# Patient Record
Sex: FEMALE | Race: BLACK OR AFRICAN AMERICAN | Employment: STUDENT | URBAN - METROPOLITAN AREA
[De-identification: names, ages, dates, MRNs, and addresses within clinical notes are randomized per-mention and may not be internally consistent; named-entity substitution may affect disease eponyms.]

---
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
## 2018-01-10 ENCOUNTER — OFFICE VISIT (OUTPATIENT)
Dept: INTERNAL MEDICINE CLINIC | Facility: CLINIC | Age: 18
End: 2018-01-10

## 2018-01-10 VITALS
WEIGHT: 211 LBS | OXYGEN SATURATION: 100 % | TEMPERATURE: 98 F | RESPIRATION RATE: 17 BRPM | BODY MASS INDEX: 35.59 KG/M2 | DIASTOLIC BLOOD PRESSURE: 66 MMHG | HEIGHT: 64.5 IN | HEART RATE: 92 BPM | SYSTOLIC BLOOD PRESSURE: 100 MMHG

## 2018-01-10 DIAGNOSIS — Z11.3 SCREEN FOR STD (SEXUALLY TRANSMITTED DISEASE): Primary | ICD-10-CM

## 2018-01-10 PROCEDURE — 87340 HEPATITIS B SURFACE AG IA: CPT | Performed by: INTERNAL MEDICINE

## 2018-01-10 PROCEDURE — 36415 COLL VENOUS BLD VENIPUNCTURE: CPT | Performed by: INTERNAL MEDICINE

## 2018-01-10 PROCEDURE — 86694 HERPES SIMPLEX NES ANTBDY: CPT | Performed by: INTERNAL MEDICINE

## 2018-01-10 PROCEDURE — 86780 TREPONEMA PALLIDUM: CPT | Performed by: INTERNAL MEDICINE

## 2018-01-10 PROCEDURE — 99213 OFFICE O/P EST LOW 20 MIN: CPT | Performed by: INTERNAL MEDICINE

## 2018-01-10 PROCEDURE — 87491 CHLMYD TRACH DNA AMP PROBE: CPT | Performed by: INTERNAL MEDICINE

## 2018-01-10 PROCEDURE — 87389 HIV-1 AG W/HIV-1&-2 AB AG IA: CPT | Performed by: INTERNAL MEDICINE

## 2018-01-10 PROCEDURE — 87591 N.GONORRHOEAE DNA AMP PROB: CPT | Performed by: INTERNAL MEDICINE

## 2018-01-10 NOTE — PROGRESS NOTES
HPI:    Patient ID: Meliza Nowak is a 21year old female. HPI    Patient wants to have std screening currently not sexually active. LMp 3/2017. She had implantable Nexplanon for contracepcion.      Singe partner in the past. Had completed immuniza ASSESSMENT/PLAN:   Screen for std (sexually transmitted disease)  (primary encounter diagnosis)      Discussed safe sex. . She stated not currently sexual active.      Orders Placed This Encounter      HIV AG AB Combo [E]      Hepatitis B Surface Antigen [

## 2018-01-10 NOTE — PROGRESS NOTES
Pt presented to clinic today for blood draw. Per physician able to draw orders. Orders  documented within chart. Pt tolerated lab draw well.  verified.   Orders drawn include: herpes, t pallidum, hepatitisb, HIV,   Site of draw:rt arm   Sp Courts,

## 2018-01-11 LAB
C TRACH DNA SPEC QL NAA+PROBE: NEGATIVE
HBV SURFACE AG SERPL QL IA: NONREACTIVE
HIV1+2 AB SERPL QL IA: NONREACTIVE
N GONORRHOEA DNA SPEC QL NAA+PROBE: NEGATIVE

## 2018-01-12 LAB — T PALLIDUM AB SER QL: NEGATIVE

## 2018-01-13 LAB
HSV TYPE 1/2 COMBINED AB, IGG: 0.5 IV
HSV TYPE 1/2 COMBINED ABS, IGM: 0.13 IV

## 2018-01-15 ENCOUNTER — TELEPHONE (OUTPATIENT)
Dept: OBGYN CLINIC | Facility: CLINIC | Age: 18
End: 2018-01-15

## 2018-01-15 RX ORDER — METRONIDAZOLE 500 MG/1
500 TABLET ORAL 2 TIMES DAILY
Qty: 14 TABLET | Refills: 0 | Status: SHIPPED | OUTPATIENT
Start: 2018-01-15 | End: 2018-01-22

## 2018-01-15 NOTE — TELEPHONE ENCOUNTER
Patient requesting RX for bacterial vaginosis. Treated in the past for it. Unable to come in prior to return to school. Agrees to see On license of [facility] for follow-up. Rx sent to pharmacy provided.

## 2018-05-30 ENCOUNTER — OFFICE VISIT (OUTPATIENT)
Dept: INTERNAL MEDICINE CLINIC | Facility: CLINIC | Age: 18
End: 2018-05-30

## 2018-05-30 VITALS
RESPIRATION RATE: 18 BRPM | OXYGEN SATURATION: 98 % | SYSTOLIC BLOOD PRESSURE: 128 MMHG | HEART RATE: 88 BPM | BODY MASS INDEX: 34.07 KG/M2 | TEMPERATURE: 98 F | DIASTOLIC BLOOD PRESSURE: 80 MMHG | HEIGHT: 64.5 IN | WEIGHT: 202 LBS

## 2018-05-30 DIAGNOSIS — D64.9 ANEMIA, UNSPECIFIED TYPE: ICD-10-CM

## 2018-05-30 DIAGNOSIS — Z51.81 THERAPEUTIC DRUG MONITORING: Primary | ICD-10-CM

## 2018-05-30 DIAGNOSIS — E66.9 OBESITY (BMI 30-39.9): ICD-10-CM

## 2018-05-30 PROCEDURE — 99215 OFFICE O/P EST HI 40 MIN: CPT | Performed by: INTERNAL MEDICINE

## 2018-05-30 PROCEDURE — 93005 ELECTROCARDIOGRAM TRACING: CPT | Performed by: INTERNAL MEDICINE

## 2018-05-30 RX ORDER — IBUPROFEN 800 MG/1
TABLET ORAL
Refills: 0 | COMMUNITY
Start: 2018-03-23 | End: 2018-05-30 | Stop reason: ALTCHOICE

## 2018-05-30 NOTE — PROGRESS NOTES
Cosme Aldrich is a 21year old female. Chief complaint:  weight loss management and obesity related comorbidities    HPI:     Cosme Aldrich is a 21year old female new to our office today.    with PMH as listed below here for weight management   Denis Hendrickson Smokeless tobacco: Never Used                      Alcohol use:  No              Family History   Problem Relation Age of Onset   • Cancer Father    • Cancer Mother    • Hypertension Maternal Sade Montano Medication: 10/10, Surgery interest: 0/10. Counseled on comprehensive weight loss plan including attention to nutrition, exercise and behavior/stress management for success.      · Advised the patient to do blood work as ordered and based on blood work w

## 2018-05-30 NOTE — PATIENT INSTRUCTIONS
Svetlana Left    Welcome to [facility]. We are excited that you are committed to improving your health and have invited our practice to be part of your journey.  Our approach to the medical management of weight loss is similar to that of other of water per day, add fiber ( benefiber) to the water to increase fullness, overcome constipation    · Eat slowly    · Do not drink your calories ( no regular pop, juice, high calorie coffee drinks, limit alcohol) Also stay away from artificially sweetened

## 2018-05-31 ENCOUNTER — LAB ENCOUNTER (OUTPATIENT)
Dept: LAB | Age: 18
End: 2018-05-31
Attending: INTERNAL MEDICINE
Payer: MEDICAID

## 2018-05-31 DIAGNOSIS — Z51.81 THERAPEUTIC DRUG MONITORING: ICD-10-CM

## 2018-05-31 DIAGNOSIS — D64.9 ANEMIA, UNSPECIFIED TYPE: ICD-10-CM

## 2018-05-31 DIAGNOSIS — E66.9 OBESITY (BMI 30-39.9): ICD-10-CM

## 2018-05-31 PROCEDURE — 82306 VITAMIN D 25 HYDROXY: CPT

## 2018-05-31 PROCEDURE — 80053 COMPREHEN METABOLIC PANEL: CPT

## 2018-05-31 PROCEDURE — 83540 ASSAY OF IRON: CPT

## 2018-05-31 PROCEDURE — 36415 COLL VENOUS BLD VENIPUNCTURE: CPT

## 2018-05-31 PROCEDURE — 82746 ASSAY OF FOLIC ACID SERUM: CPT

## 2018-05-31 PROCEDURE — 84466 ASSAY OF TRANSFERRIN: CPT

## 2018-05-31 PROCEDURE — 82607 VITAMIN B-12: CPT

## 2018-05-31 PROCEDURE — 84443 ASSAY THYROID STIM HORMONE: CPT

## 2018-05-31 PROCEDURE — 85025 COMPLETE CBC W/AUTO DIFF WBC: CPT

## 2018-05-31 PROCEDURE — 80061 LIPID PANEL: CPT

## 2018-05-31 PROCEDURE — 82728 ASSAY OF FERRITIN: CPT

## 2018-06-04 ENCOUNTER — TELEPHONE (OUTPATIENT)
Dept: INTERNAL MEDICINE CLINIC | Facility: CLINIC | Age: 18
End: 2018-06-04

## 2018-06-04 RX ORDER — PHENTERMINE HYDROCHLORIDE 15 MG/1
15 CAPSULE ORAL EVERY MORNING
Qty: 30 CAPSULE | Refills: 0 | Status: SHIPPED | OUTPATIENT
Start: 2018-06-04 | End: 2018-11-12

## 2018-06-04 RX ORDER — ERGOCALCIFEROL (VITAMIN D2) 1250 MCG
50000 CAPSULE ORAL WEEKLY
Qty: 12 CAPSULE | Refills: 1 | Status: SHIPPED | OUTPATIENT
Start: 2018-06-04 | End: 2020-06-24

## 2018-06-13 ENCOUNTER — OFFICE VISIT (OUTPATIENT)
Dept: INTERNAL MEDICINE CLINIC | Facility: CLINIC | Age: 18
End: 2018-06-13

## 2018-06-13 VITALS
BODY MASS INDEX: 34.07 KG/M2 | TEMPERATURE: 98 F | SYSTOLIC BLOOD PRESSURE: 110 MMHG | DIASTOLIC BLOOD PRESSURE: 64 MMHG | WEIGHT: 202 LBS | HEIGHT: 64.5 IN | OXYGEN SATURATION: 100 % | HEART RATE: 93 BPM

## 2018-06-13 DIAGNOSIS — D64.9 ANEMIA, UNSPECIFIED TYPE: ICD-10-CM

## 2018-06-13 DIAGNOSIS — Z00.00 PHYSICAL EXAM: Primary | ICD-10-CM

## 2018-06-13 DIAGNOSIS — J03.90 TONSILLITIS: ICD-10-CM

## 2018-06-13 PROCEDURE — 36415 COLL VENOUS BLD VENIPUNCTURE: CPT | Performed by: FAMILY MEDICINE

## 2018-06-13 PROCEDURE — 99395 PREV VISIT EST AGE 18-39: CPT | Performed by: FAMILY MEDICINE

## 2018-06-13 PROCEDURE — 86308 HETEROPHILE ANTIBODY SCREEN: CPT | Performed by: FAMILY MEDICINE

## 2018-06-13 RX ORDER — AMOXICILLIN AND CLAVULANATE POTASSIUM 875; 125 MG/1; MG/1
1 TABLET, FILM COATED ORAL 2 TIMES DAILY
Qty: 20 TABLET | Refills: 0 | Status: SHIPPED | OUTPATIENT
Start: 2018-06-13 | End: 2018-06-23

## 2018-06-13 RX ORDER — IBUPROFEN 600 MG/1
600 TABLET ORAL EVERY 6 HOURS PRN
Qty: 30 TABLET | Refills: 1 | Status: SHIPPED | OUTPATIENT
Start: 2018-06-13 | End: 2020-06-24

## 2018-06-13 NOTE — PROGRESS NOTES
Mikey Peguero is a 21year old female who presents for physical      Future Plans:[employer] [facility] [employer]   Diet and exercise: healthy  Immunizations: UTD  Sexually active: yes  Smoker: no   EtoH: no  Birth control: condoms, nexplanon yes  Former pelvic exam no  Year:  NEURO: denies headaches    EXAM:  /64   Pulse 93   Temp 98.4 °F (36.9 °C) (Oral)   Ht 64.5\"   Wt 202 lb   SpO2 100%   BMI 34.14 kg/m²   GENERAL: well developed, well nourished and in no apparent distress  SKIN: no

## 2018-06-13 NOTE — PATIENT INSTRUCTIONS
Gargle with warm salt water    Ibuprofen     Push fluids    Start iron twice daily with food once feeling better

## 2018-07-13 ENCOUNTER — TELEPHONE (OUTPATIENT)
Dept: OBGYN CLINIC | Facility: CLINIC | Age: 18
End: 2018-07-13

## 2019-01-04 ENCOUNTER — OFFICE VISIT (OUTPATIENT)
Dept: INTERNAL MEDICINE CLINIC | Facility: CLINIC | Age: 19
End: 2019-01-04
Payer: COMMERCIAL

## 2019-01-04 VITALS
DIASTOLIC BLOOD PRESSURE: 68 MMHG | SYSTOLIC BLOOD PRESSURE: 118 MMHG | WEIGHT: 214 LBS | OXYGEN SATURATION: 98 % | HEART RATE: 78 BPM | BODY MASS INDEX: 36.09 KG/M2 | TEMPERATURE: 99 F | HEIGHT: 64.5 IN

## 2019-01-04 DIAGNOSIS — B37.3 YEAST INFECTION OF THE VAGINA: Primary | ICD-10-CM

## 2019-01-04 DIAGNOSIS — R63.4 WEIGHT LOSS: ICD-10-CM

## 2019-01-04 DIAGNOSIS — L70.0 ACNE VULGARIS: ICD-10-CM

## 2019-01-04 DIAGNOSIS — B35.1 ONYCHOMYCOSIS: ICD-10-CM

## 2019-01-04 PROCEDURE — 88175 CYTOPATH C/V AUTO FLUID REDO: CPT | Performed by: INTERNAL MEDICINE

## 2019-01-04 PROCEDURE — 99214 OFFICE O/P EST MOD 30 MIN: CPT | Performed by: INTERNAL MEDICINE

## 2019-01-04 PROCEDURE — 87808 TRICHOMONAS ASSAY W/OPTIC: CPT | Performed by: INTERNAL MEDICINE

## 2019-01-04 PROCEDURE — 87106 FUNGI IDENTIFICATION YEAST: CPT | Performed by: INTERNAL MEDICINE

## 2019-01-04 PROCEDURE — 87205 SMEAR GRAM STAIN: CPT | Performed by: INTERNAL MEDICINE

## 2019-01-04 RX ORDER — PHENTERMINE HYDROCHLORIDE 15 MG/1
15 CAPSULE ORAL EVERY MORNING
Qty: 90 CAPSULE | Refills: 0 | Status: SHIPPED | OUTPATIENT
Start: 2019-01-04 | End: 2019-06-05

## 2019-01-04 RX ORDER — FLUCONAZOLE 150 MG/1
150 TABLET ORAL DAILY
Qty: 3 TABLET | Refills: 3 | Status: SHIPPED | OUTPATIENT
Start: 2019-01-04 | End: 2019-06-05 | Stop reason: ALTCHOICE

## 2019-01-04 RX ORDER — CLINDAMYCIN PHOSPHATE 10 MG/G
GEL TOPICAL
Qty: 45 G | Refills: 3 | Status: SHIPPED | OUTPATIENT
Start: 2019-01-04 | End: 2020-06-24

## 2019-01-04 NOTE — PROGRESS NOTES
HPI:    Patient ID: Janak Lau is a 19year old female. Pt here for evlaution of of some vaginal discomfort and vaginal dc. Has pain with intercourse. Is on explanon for contraception. Also has some back acne.     Review of Systems   Genitourinar Randal Hernandez MD  1/4/2019

## 2019-01-05 LAB
GENITAL VAGINOSIS SCREEN: POSITIVE
TRICHOMONAS SCREEN: NEGATIVE

## 2019-01-07 LAB
LAST PAP RESULT: NORMAL
PAP HISTORY (OTHER THAN LAST PAP): NORMAL

## 2019-03-01 ENCOUNTER — PATIENT MESSAGE (OUTPATIENT)
Dept: INTERNAL MEDICINE CLINIC | Facility: CLINIC | Age: 19
End: 2019-03-01

## 2019-03-01 RX ORDER — CLINDAMYCIN PHOSPHATE 11.9 MG/ML
1 SOLUTION TOPICAL 2 TIMES DAILY
Qty: 1 BOTTLE | Refills: 1 | Status: SHIPPED | OUTPATIENT
Start: 2019-03-01 | End: 2019-03-03

## 2019-03-01 RX ORDER — DIAPER,BRIEF,INFANT-TODD,DISP
1 EACH MISCELLANEOUS 2 TIMES DAILY
Qty: 1 G | Refills: 0 | Status: SHIPPED | OUTPATIENT
Start: 2019-03-01 | End: 2019-03-03

## 2019-03-03 DIAGNOSIS — L70.9 ACNE, UNSPECIFIED ACNE TYPE: Primary | ICD-10-CM

## 2019-03-03 RX ORDER — CLINDAMYCIN PHOSPHATE 11.9 MG/ML
1 SOLUTION TOPICAL 2 TIMES DAILY
Qty: 1 BOTTLE | Refills: 1 | Status: SHIPPED | OUTPATIENT
Start: 2019-03-03 | End: 2020-06-24

## 2019-03-03 RX ORDER — DIAPER,BRIEF,INFANT-TODD,DISP
1 EACH MISCELLANEOUS 2 TIMES DAILY
Qty: 1 G | Refills: 0 | Status: SHIPPED | OUTPATIENT
Start: 2019-03-03 | End: 2020-06-24

## 2019-05-28 ENCOUNTER — OFFICE VISIT (OUTPATIENT)
Dept: INTERNAL MEDICINE CLINIC | Facility: CLINIC | Age: 19
End: 2019-05-28
Payer: COMMERCIAL

## 2019-05-28 VITALS
SYSTOLIC BLOOD PRESSURE: 110 MMHG | WEIGHT: 202.19 LBS | DIASTOLIC BLOOD PRESSURE: 70 MMHG | TEMPERATURE: 99 F | HEIGHT: 64.5 IN | HEART RATE: 79 BPM | BODY MASS INDEX: 34.1 KG/M2 | RESPIRATION RATE: 17 BRPM

## 2019-05-28 DIAGNOSIS — D64.9 ANEMIA, UNSPECIFIED TYPE: ICD-10-CM

## 2019-05-28 DIAGNOSIS — E53.8 VITAMIN B12 DEFICIENCY: ICD-10-CM

## 2019-05-28 DIAGNOSIS — Z51.81 THERAPEUTIC DRUG MONITORING: Primary | ICD-10-CM

## 2019-05-28 DIAGNOSIS — Z11.3 SCREENING EXAMINATION FOR STD (SEXUALLY TRANSMITTED DISEASE): ICD-10-CM

## 2019-05-28 DIAGNOSIS — E66.9 OBESITY (BMI 30-39.9): ICD-10-CM

## 2019-05-28 PROCEDURE — 86803 HEPATITIS C AB TEST: CPT | Performed by: INTERNAL MEDICINE

## 2019-05-28 PROCEDURE — 82607 VITAMIN B-12: CPT | Performed by: INTERNAL MEDICINE

## 2019-05-28 PROCEDURE — 99214 OFFICE O/P EST MOD 30 MIN: CPT | Performed by: INTERNAL MEDICINE

## 2019-05-28 PROCEDURE — 87340 HEPATITIS B SURFACE AG IA: CPT | Performed by: INTERNAL MEDICINE

## 2019-05-28 PROCEDURE — 87389 HIV-1 AG W/HIV-1&-2 AB AG IA: CPT | Performed by: INTERNAL MEDICINE

## 2019-05-28 PROCEDURE — 87491 CHLMYD TRACH DNA AMP PROBE: CPT | Performed by: INTERNAL MEDICINE

## 2019-05-28 PROCEDURE — 86780 TREPONEMA PALLIDUM: CPT | Performed by: INTERNAL MEDICINE

## 2019-05-28 PROCEDURE — 82728 ASSAY OF FERRITIN: CPT | Performed by: INTERNAL MEDICINE

## 2019-05-28 PROCEDURE — 96372 THER/PROPH/DIAG INJ SC/IM: CPT | Performed by: INTERNAL MEDICINE

## 2019-05-28 PROCEDURE — 85025 COMPLETE CBC W/AUTO DIFF WBC: CPT | Performed by: INTERNAL MEDICINE

## 2019-05-28 PROCEDURE — 87591 N.GONORRHOEAE DNA AMP PROB: CPT | Performed by: INTERNAL MEDICINE

## 2019-05-28 RX ORDER — CYANOCOBALAMIN 1000 UG/ML
1000 INJECTION INTRAMUSCULAR; SUBCUTANEOUS ONCE
Status: COMPLETED | OUTPATIENT
Start: 2019-05-28 | End: 2019-05-28

## 2019-05-28 RX ADMIN — CYANOCOBALAMIN 1000 MCG: 1000 INJECTION INTRAMUSCULAR; SUBCUTANEOUS at 11:11:00

## 2019-05-28 NOTE — PROGRESS NOTES
Nahid Bradford is a 19year old female.     Chief complaint:weight loss follow up , medication refill, therapeutic drug monitoring     HPI:   Baljit Peralta here for follow up on weight loss   Wt Readings from Last 12 Encounters:  05/28/19 : 202 lb 3.2 oz  01/0 External Gel Topically bid Disp: 45 g Rfl: 3   ibuprofen 600 MG Oral Tab Take 1 tablet (600 mg total) by mouth every 6 (six) hours as needed for Pain.  Disp: 30 tablet Rfl: 1   ergocalciferol 11083 units Oral Cap Take 1 capsule (50,000 Units total) by mouth AB COMBO, CBC WITH DIFFERENTIAL WITH PLATELET,         FERRITIN, VITAMIN B12, HCV ANTIBODY,         CHLAMYDIA/GONOCOCCUS, ISABEL, HEPATITIS B SURFACE         ANTIGEN, T PALLIDUM SCREENING CASCADE, HIV AG         AB COMBO, CBC W/ DIFFERENTIAL, HIV AG AB C to the clinic if you are having persistent or worsening symptoms   Meche Lira MD,   Diplomate of the American Board of Internal Medicine  Diplomate of the American Board of Obesity Medicine

## 2019-05-28 NOTE — PATIENT INSTRUCTIONS
Lorcaserin extended-release tablets  Brand Name: Belviq XR  What is this medicine? LORCASERIN (jolly ca SER in) is used to promote and maintain weight loss in obese patients.  This medicine should be used with a reduced calorie diet and, if appropriate, an Side effects that usually do not require medical attention (report to your doctor or health care professional if they continue or are bothersome):  · back pain  · constipation  · cough  · dry mouth  · nausea  · tiredness  What may interact with this medici · pregnant or trying to get pregnant  · breast-feeding  What should I watch for while using this medicine? This medicine is intended to be used in addition to a healthy diet and appropriate exercise. The best results are achieved this way.  Your doctor vlad

## 2019-05-29 RX ORDER — AZITHROMYCIN 500 MG/1
1000 TABLET, FILM COATED ORAL ONCE
Qty: 2 TABLET | Refills: 0 | Status: SHIPPED | OUTPATIENT
Start: 2019-05-29 | End: 2019-05-29

## 2019-05-29 RX ORDER — DOXYCYCLINE HYCLATE 100 MG
100 TABLET ORAL 2 TIMES DAILY
Qty: 14 TABLET | Refills: 0 | Status: SHIPPED | OUTPATIENT
Start: 2019-05-29 | End: 2019-06-05 | Stop reason: ALTCHOICE

## 2019-05-31 ENCOUNTER — TELEPHONE (OUTPATIENT)
Dept: INTERNAL MEDICINE CLINIC | Facility: CLINIC | Age: 19
End: 2019-05-31

## 2019-06-05 ENCOUNTER — OFFICE VISIT (OUTPATIENT)
Dept: INTERNAL MEDICINE CLINIC | Facility: CLINIC | Age: 19
End: 2019-06-05
Payer: COMMERCIAL

## 2019-06-05 VITALS
HEART RATE: 77 BPM | HEIGHT: 64.5 IN | DIASTOLIC BLOOD PRESSURE: 73 MMHG | BODY MASS INDEX: 33.96 KG/M2 | WEIGHT: 201.38 LBS | SYSTOLIC BLOOD PRESSURE: 107 MMHG | OXYGEN SATURATION: 95 %

## 2019-06-05 DIAGNOSIS — R30.0 DYSURIA: Primary | ICD-10-CM

## 2019-06-05 PROCEDURE — 99213 OFFICE O/P EST LOW 20 MIN: CPT | Performed by: INTERNAL MEDICINE

## 2019-06-05 NOTE — PROGRESS NOTES
HPI:    Patient ID: Guero Adames is a 19year old female. Pt herer for evalaution of some dysuria which was associated with treatment for chlamydia - treated with zpack and doxycycline. The symptoms were very short lived.      Review of Systems   G

## 2019-08-03 DIAGNOSIS — J03.90 TONSILLITIS: ICD-10-CM

## 2019-08-05 RX ORDER — IBUPROFEN 600 MG/1
TABLET ORAL
Qty: 30 TABLET | Refills: 0 | OUTPATIENT
Start: 2019-08-05

## 2019-12-28 ENCOUNTER — HOSPITAL ENCOUNTER (EMERGENCY)
Facility: HOSPITAL | Age: 19
Discharge: HOME | End: 2019-12-28
Attending: EMERGENCY MEDICINE
Payer: MEDICAID

## 2019-12-28 VITALS
RESPIRATION RATE: 18 BRPM | HEART RATE: 83 BPM | DIASTOLIC BLOOD PRESSURE: 79 MMHG | WEIGHT: 180 LBS | BODY MASS INDEX: 30.73 KG/M2 | HEIGHT: 64 IN | TEMPERATURE: 98 F | SYSTOLIC BLOOD PRESSURE: 122 MMHG | OXYGEN SATURATION: 100 %

## 2019-12-28 DIAGNOSIS — J02.9 SORE THROAT: Primary | ICD-10-CM

## 2019-12-28 LAB — S PYO AG THROAT QL: NEGATIVE

## 2019-12-28 PROCEDURE — 87081 CULTURE SCREEN ONLY: CPT | Performed by: EMERGENCY MEDICINE

## 2019-12-28 PROCEDURE — 99283 EMERGENCY DEPT VISIT LOW MDM: CPT

## 2019-12-28 PROCEDURE — 87147 CULTURE TYPE IMMUNOLOGIC: CPT | Performed by: EMERGENCY MEDICINE

## 2019-12-28 PROCEDURE — 87430 STREP A AG IA: CPT

## 2019-12-28 RX ORDER — AMOXICILLIN 500 MG/1
500 TABLET, FILM COATED ORAL 2 TIMES DAILY
Qty: 20 TABLET | Refills: 0 | Status: SHIPPED | OUTPATIENT
Start: 2019-12-28 | End: 2020-01-07

## 2019-12-28 NOTE — ED PROVIDER NOTES
Patient Seen in: [facility] AND [facility] Emergency Department    History   Patient presents with:  Sore Throat    Stated Complaint: sore throat    HPI    Patient here complaining of sore throat for 5 days.   Notes pain is described as similar to prior strep thr Exam     ED Triage Vitals [12/28/19 0911]   /79   Pulse 83   Resp 18   Temp 97.9 °F (36.6 °C)   Temp src Temporal   SpO2 100 %   O2 Device None (Room air)       Current:/79   Pulse 83   Temp 97.9 °F (36.6 °C) (Temporal)   Resp 18   Ht 162.6 cm Normal, Disp-20 tablet, R-0

## 2020-04-04 ENCOUNTER — VIRTUAL PHONE E/M (OUTPATIENT)
Dept: INTERNAL MEDICINE CLINIC | Facility: CLINIC | Age: 20
End: 2020-04-04

## 2020-04-04 DIAGNOSIS — J03.90 TONSILLITIS: Primary | ICD-10-CM

## 2020-04-04 PROCEDURE — 99441 PHONE E/M BY PHYS 5-10 MIN: CPT | Performed by: FAMILY MEDICINE

## 2020-04-04 RX ORDER — AMOXICILLIN 875 MG/1
875 TABLET, COATED ORAL 2 TIMES DAILY
Qty: 20 TABLET | Refills: 0 | Status: SHIPPED | OUTPATIENT
Start: 2020-04-04 | End: 2020-04-14

## 2020-04-04 NOTE — PROGRESS NOTES
Virtual/Telephone Check-In    Missy Ortiz verbally consents to a Virtual/Telephone Check-In service on 04/04/20. Patient understands and accepts financial responsibility for any deductible, co-insurance and/or co-pays associated with this service.

## 2020-05-21 ENCOUNTER — TELEPHONE (OUTPATIENT)
Dept: OTOLARYNGOLOGY | Facility: CLINIC | Age: 20
End: 2020-05-21

## 2020-05-21 NOTE — TELEPHONE ENCOUNTER
Pt requesting a video appointment. Pt has swollen tonsil. Pt stated pt has [facility] but does not have the id number. Pt aware self pay until provides insurance information.   Call pt to advise

## 2020-05-26 NOTE — TELEPHONE ENCOUNTER
Pt scheduled for office visit, as she is currently in [facility] state until end of June. Pt was advised by ENT in [facility] that she will need surgery. Pt will call back with insurance , per pt she has [facility] [employer] [facility].

## 2020-06-24 ENCOUNTER — OFFICE VISIT (OUTPATIENT)
Dept: OBGYN CLINIC | Facility: CLINIC | Age: 20
End: 2020-06-24
Payer: MEDICAID

## 2020-06-24 ENCOUNTER — LAB ENCOUNTER (OUTPATIENT)
Dept: LAB | Facility: HOSPITAL | Age: 20
End: 2020-06-24
Attending: ADVANCED PRACTICE MIDWIFE
Payer: MEDICAID

## 2020-06-24 VITALS — SYSTOLIC BLOOD PRESSURE: 98 MMHG | WEIGHT: 210 LBS | BODY MASS INDEX: 36 KG/M2 | DIASTOLIC BLOOD PRESSURE: 78 MMHG

## 2020-06-24 DIAGNOSIS — Z30.46 ENCOUNTER FOR REMOVAL OF SUBDERMAL CONTRACEPTIVE IMPLANT: Primary | ICD-10-CM

## 2020-06-24 DIAGNOSIS — Z11.3 SCREEN FOR STD (SEXUALLY TRANSMITTED DISEASE): ICD-10-CM

## 2020-06-24 PROCEDURE — 87340 HEPATITIS B SURFACE AG IA: CPT

## 2020-06-24 PROCEDURE — 87389 HIV-1 AG W/HIV-1&-2 AB AG IA: CPT

## 2020-06-24 PROCEDURE — 86803 HEPATITIS C AB TEST: CPT

## 2020-06-24 PROCEDURE — 36415 COLL VENOUS BLD VENIPUNCTURE: CPT

## 2020-06-24 PROCEDURE — 11982 REMOVE DRUG IMPLANT DEVICE: CPT | Performed by: ADVANCED PRACTICE MIDWIFE

## 2020-06-24 PROCEDURE — 86780 TREPONEMA PALLIDUM: CPT

## 2020-06-25 ENCOUNTER — OFFICE VISIT (OUTPATIENT)
Dept: OTOLARYNGOLOGY | Facility: CLINIC | Age: 20
End: 2020-06-25
Payer: MEDICAID

## 2020-06-25 ENCOUNTER — TELEPHONE (OUTPATIENT)
Dept: OBGYN CLINIC | Facility: CLINIC | Age: 20
End: 2020-06-25

## 2020-06-25 VITALS
BODY MASS INDEX: 34.57 KG/M2 | HEIGHT: 64.5 IN | WEIGHT: 205 LBS | SYSTOLIC BLOOD PRESSURE: 106 MMHG | DIASTOLIC BLOOD PRESSURE: 77 MMHG | HEART RATE: 74 BPM

## 2020-06-25 DIAGNOSIS — J03.90 TONSILLITIS: Primary | ICD-10-CM

## 2020-06-25 PROCEDURE — 99203 OFFICE O/P NEW LOW 30 MIN: CPT | Performed by: OTOLARYNGOLOGY

## 2020-06-25 NOTE — PROGRESS NOTES
Lucius Bernardo is a 22year old female. Patient presents with: Tonsil Problem    HPI:   She has been experiencing frequent problems with tonsil infections.   She has had recurrent episodes of tonsillitis several times per year and it is just becoming mo Anterior cervical. Posterior cervical. Supraclavicular.    Eyes Normal Conjunctiva - Right: Normal, Left: Normal. Pupil - Right: Normal, Left: Normal.    Ears Normal Inspection - Right: Normal, Left: Normal. Canal - Left: Normal. TM - Right: Normal, Left: N

## 2020-06-25 NOTE — TELEPHONE ENCOUNTER
----- Message from Natali Win CNM sent at 6/25/2020 12:00 PM CDT -----  Normal results. Please call to inform.

## 2020-06-29 NOTE — PROCEDURES
Nexplanon Removal    Pregnancy Results: negative from urine test   Birth control method(s) used:  ;Consent was obtained from the patient.     Removal:  2 % lidocaine with Epinephrine was injected underneath the tip of the Nexplanon nery that is closest to th

## 2020-07-20 ENCOUNTER — LAB ENCOUNTER (OUTPATIENT)
Dept: LAB | Facility: HOSPITAL | Age: 20
End: 2020-07-20
Attending: OTOLARYNGOLOGY
Payer: MEDICAID

## 2020-07-20 DIAGNOSIS — Z01.818 PRE-OP TESTING: ICD-10-CM

## 2020-07-20 LAB — SARS-COV-2 RNA RESP QL NAA+PROBE: NOT DETECTED

## 2020-07-22 ENCOUNTER — HOSPITAL ENCOUNTER (OUTPATIENT)
Facility: HOSPITAL | Age: 20
Setting detail: HOSPITAL OUTPATIENT SURGERY
Discharge: HOME | End: 2020-07-22
Attending: OTOLARYNGOLOGY | Admitting: OTOLARYNGOLOGY
Payer: MEDICAID

## 2020-07-22 ENCOUNTER — ANESTHESIA (OUTPATIENT)
Dept: SURGERY | Facility: HOSPITAL | Age: 20
End: 2020-07-22
Payer: MEDICAID

## 2020-07-22 ENCOUNTER — ANESTHESIA EVENT (OUTPATIENT)
Dept: SURGERY | Facility: HOSPITAL | Age: 20
End: 2020-07-22
Payer: MEDICAID

## 2020-07-22 VITALS
WEIGHT: 208 LBS | DIASTOLIC BLOOD PRESSURE: 80 MMHG | RESPIRATION RATE: 16 BRPM | OXYGEN SATURATION: 99 % | TEMPERATURE: 98 F | HEART RATE: 71 BPM | SYSTOLIC BLOOD PRESSURE: 121 MMHG | HEIGHT: 64.6 IN | BODY MASS INDEX: 35.08 KG/M2

## 2020-07-22 DIAGNOSIS — Z01.818 PRE-OP TESTING: Primary | ICD-10-CM

## 2020-07-22 DIAGNOSIS — J35.1 HYPERTROPHY OF TONSILS ALONE: ICD-10-CM

## 2020-07-22 PROBLEM — J35.01 CHRONIC TONSILLITIS: Status: ACTIVE | Noted: 2020-07-22

## 2020-07-22 LAB — B-HCG UR QL: NEGATIVE

## 2020-07-22 PROCEDURE — 42826 REMOVAL OF TONSILS: CPT | Performed by: OTOLARYNGOLOGY

## 2020-07-22 PROCEDURE — 0CTPXZZ RESECTION OF TONSILS, EXTERNAL APPROACH: ICD-10-PCS | Performed by: OTOLARYNGOLOGY

## 2020-07-22 RX ORDER — GLYCOPYRROLATE 0.2 MG/ML
INJECTION, SOLUTION INTRAMUSCULAR; INTRAVENOUS AS NEEDED
Status: DISCONTINUED | OUTPATIENT
Start: 2020-07-22 | End: 2020-07-22 | Stop reason: SURG

## 2020-07-22 RX ORDER — HYDROCODONE BITARTRATE AND ACETAMINOPHEN 5; 325 MG/1; MG/1
2 TABLET ORAL AS NEEDED
Status: DISCONTINUED | OUTPATIENT
Start: 2020-07-22 | End: 2020-07-22

## 2020-07-22 RX ORDER — LIDOCAINE HYDROCHLORIDE 10 MG/ML
INJECTION, SOLUTION EPIDURAL; INFILTRATION; INTRACAUDAL; PERINEURAL AS NEEDED
Status: DISCONTINUED | OUTPATIENT
Start: 2020-07-22 | End: 2020-07-22 | Stop reason: SURG

## 2020-07-22 RX ORDER — HALOPERIDOL 5 MG/ML
0.25 INJECTION INTRAMUSCULAR ONCE AS NEEDED
Status: DISCONTINUED | OUTPATIENT
Start: 2020-07-22 | End: 2020-07-22

## 2020-07-22 RX ORDER — MORPHINE SULFATE 10 MG/ML
6 INJECTION, SOLUTION INTRAMUSCULAR; INTRAVENOUS EVERY 10 MIN PRN
Status: DISCONTINUED | OUTPATIENT
Start: 2020-07-22 | End: 2020-07-22

## 2020-07-22 RX ORDER — ONDANSETRON 2 MG/ML
4 INJECTION INTRAMUSCULAR; INTRAVENOUS ONCE AS NEEDED
Status: DISCONTINUED | OUTPATIENT
Start: 2020-07-22 | End: 2020-07-22

## 2020-07-22 RX ORDER — FAMOTIDINE 20 MG/1
20 TABLET ORAL ONCE
Status: COMPLETED | OUTPATIENT
Start: 2020-07-22 | End: 2020-07-22

## 2020-07-22 RX ORDER — HYDROCODONE BITARTRATE AND ACETAMINOPHEN 5; 325 MG/1; MG/1
1 TABLET ORAL AS NEEDED
Status: DISCONTINUED | OUTPATIENT
Start: 2020-07-22 | End: 2020-07-22

## 2020-07-22 RX ORDER — SODIUM CHLORIDE, SODIUM LACTATE, POTASSIUM CHLORIDE, CALCIUM CHLORIDE 600; 310; 30; 20 MG/100ML; MG/100ML; MG/100ML; MG/100ML
INJECTION, SOLUTION INTRAVENOUS CONTINUOUS
Status: DISCONTINUED | OUTPATIENT
Start: 2020-07-22 | End: 2020-07-22

## 2020-07-22 RX ORDER — ACETAMINOPHEN 160 MG/5ML
650 SOLUTION ORAL EVERY 4 HOURS PRN
Status: DISCONTINUED | OUTPATIENT
Start: 2020-07-22 | End: 2020-07-22

## 2020-07-22 RX ORDER — LIDOCAINE HYDROCHLORIDE AND EPINEPHRINE 10; 10 MG/ML; UG/ML
INJECTION, SOLUTION INFILTRATION; PERINEURAL AS NEEDED
Status: DISCONTINUED | OUTPATIENT
Start: 2020-07-22 | End: 2020-07-22 | Stop reason: HOSPADM

## 2020-07-22 RX ORDER — AMOXICILLIN 400 MG/5ML
800 POWDER, FOR SUSPENSION ORAL 2 TIMES DAILY
Qty: 140 ML | Refills: 0 | Status: SHIPPED | OUTPATIENT
Start: 2020-07-22 | End: 2020-07-29

## 2020-07-22 RX ORDER — SODIUM CHLORIDE 0.9 % (FLUSH) 0.9 %
10 SYRINGE (ML) INJECTION AS NEEDED
Status: DISCONTINUED | OUTPATIENT
Start: 2020-07-22 | End: 2020-07-22

## 2020-07-22 RX ORDER — PREDNISOLONE 15 MG/5 ML
15 SOLUTION, ORAL ORAL 2 TIMES DAILY
Qty: 70 ML | Refills: 0 | Status: SHIPPED | OUTPATIENT
Start: 2020-07-22 | End: 2020-07-29

## 2020-07-22 RX ORDER — MORPHINE SULFATE 4 MG/ML
2 INJECTION, SOLUTION INTRAMUSCULAR; INTRAVENOUS EVERY 10 MIN PRN
Status: DISCONTINUED | OUTPATIENT
Start: 2020-07-22 | End: 2020-07-22

## 2020-07-22 RX ORDER — DEXAMETHASONE SODIUM PHOSPHATE 4 MG/ML
VIAL (ML) INJECTION AS NEEDED
Status: DISCONTINUED | OUTPATIENT
Start: 2020-07-22 | End: 2020-07-22 | Stop reason: SURG

## 2020-07-22 RX ORDER — MORPHINE SULFATE 4 MG/ML
4 INJECTION, SOLUTION INTRAMUSCULAR; INTRAVENOUS EVERY 10 MIN PRN
Status: DISCONTINUED | OUTPATIENT
Start: 2020-07-22 | End: 2020-07-22

## 2020-07-22 RX ORDER — DIAPER,BRIEF,INFANT-TODD,DISP
EACH MISCELLANEOUS AS NEEDED
Status: DISCONTINUED | OUTPATIENT
Start: 2020-07-22 | End: 2020-07-22 | Stop reason: HOSPADM

## 2020-07-22 RX ORDER — ACETAMINOPHEN AND CODEINE PHOSPHATE 120; 12 MG/5ML; MG/5ML
36 SOLUTION ORAL EVERY 4 HOURS PRN
Status: DISCONTINUED | OUTPATIENT
Start: 2020-07-22 | End: 2020-07-22

## 2020-07-22 RX ORDER — HYDROMORPHONE HYDROCHLORIDE 1 MG/ML
0.2 INJECTION, SOLUTION INTRAMUSCULAR; INTRAVENOUS; SUBCUTANEOUS EVERY 5 MIN PRN
Status: DISCONTINUED | OUTPATIENT
Start: 2020-07-22 | End: 2020-07-22

## 2020-07-22 RX ORDER — METOCLOPRAMIDE 10 MG/1
10 TABLET ORAL ONCE
Status: COMPLETED | OUTPATIENT
Start: 2020-07-22 | End: 2020-07-22

## 2020-07-22 RX ORDER — ROCURONIUM BROMIDE 10 MG/ML
INJECTION, SOLUTION INTRAVENOUS AS NEEDED
Status: DISCONTINUED | OUTPATIENT
Start: 2020-07-22 | End: 2020-07-22 | Stop reason: SURG

## 2020-07-22 RX ORDER — ONDANSETRON 2 MG/ML
INJECTION INTRAMUSCULAR; INTRAVENOUS AS NEEDED
Status: DISCONTINUED | OUTPATIENT
Start: 2020-07-22 | End: 2020-07-22 | Stop reason: SURG

## 2020-07-22 RX ORDER — ACETAMINOPHEN 500 MG
1000 TABLET ORAL ONCE
Status: COMPLETED | OUTPATIENT
Start: 2020-07-22 | End: 2020-07-22

## 2020-07-22 RX ORDER — NALOXONE HYDROCHLORIDE 0.4 MG/ML
80 INJECTION, SOLUTION INTRAMUSCULAR; INTRAVENOUS; SUBCUTANEOUS AS NEEDED
Status: DISCONTINUED | OUTPATIENT
Start: 2020-07-22 | End: 2020-07-22

## 2020-07-22 RX ORDER — HYDROMORPHONE HYDROCHLORIDE 1 MG/ML
0.6 INJECTION, SOLUTION INTRAMUSCULAR; INTRAVENOUS; SUBCUTANEOUS EVERY 5 MIN PRN
Status: DISCONTINUED | OUTPATIENT
Start: 2020-07-22 | End: 2020-07-22

## 2020-07-22 RX ORDER — LIDOCAINE HYDROCHLORIDE 40 MG/ML
SOLUTION TOPICAL AS NEEDED
Status: DISCONTINUED | OUTPATIENT
Start: 2020-07-22 | End: 2020-07-22 | Stop reason: SURG

## 2020-07-22 RX ORDER — HYDROMORPHONE HYDROCHLORIDE 1 MG/ML
0.4 INJECTION, SOLUTION INTRAMUSCULAR; INTRAVENOUS; SUBCUTANEOUS EVERY 5 MIN PRN
Status: DISCONTINUED | OUTPATIENT
Start: 2020-07-22 | End: 2020-07-22

## 2020-07-22 RX ORDER — PROCHLORPERAZINE EDISYLATE 5 MG/ML
5 INJECTION INTRAMUSCULAR; INTRAVENOUS ONCE AS NEEDED
Status: DISCONTINUED | OUTPATIENT
Start: 2020-07-22 | End: 2020-07-22

## 2020-07-22 RX ADMIN — LIDOCAINE HYDROCHLORIDE 4 ML: 40 SOLUTION TOPICAL at 09:33:00

## 2020-07-22 RX ADMIN — LIDOCAINE HYDROCHLORIDE 50 MG: 10 INJECTION, SOLUTION EPIDURAL; INFILTRATION; INTRACAUDAL; PERINEURAL at 09:33:00

## 2020-07-22 RX ADMIN — ONDANSETRON 4 MG: 2 INJECTION INTRAMUSCULAR; INTRAVENOUS at 09:39:00

## 2020-07-22 RX ADMIN — GLYCOPYRROLATE 0.2 MG: 0.2 INJECTION, SOLUTION INTRAMUSCULAR; INTRAVENOUS at 09:33:00

## 2020-07-22 RX ADMIN — ROCURONIUM BROMIDE 10 MG: 10 INJECTION, SOLUTION INTRAVENOUS at 09:33:00

## 2020-07-22 RX ADMIN — SODIUM CHLORIDE, SODIUM LACTATE, POTASSIUM CHLORIDE, CALCIUM CHLORIDE: 600; 310; 30; 20 INJECTION, SOLUTION INTRAVENOUS at 10:15:00

## 2020-07-22 RX ADMIN — SODIUM CHLORIDE, SODIUM LACTATE, POTASSIUM CHLORIDE, CALCIUM CHLORIDE: 600; 310; 30; 20 INJECTION, SOLUTION INTRAVENOUS at 09:39:00

## 2020-07-22 RX ADMIN — DEXAMETHASONE SODIUM PHOSPHATE 8 MG: 4 MG/ML VIAL (ML) INJECTION at 09:39:00

## 2020-07-22 NOTE — ANESTHESIA PROCEDURE NOTES
Airway  Date/Time: 7/22/2020 9:33 AM  Urgency: Elective    Airway not difficult    General Information and Staff    Patient location during procedure: OR  Anesthesiologist: Jennifer Gonzales MD  Performed: anesthesiologist     Indications and Patient Condition

## 2020-07-22 NOTE — H&P
She has been experiencing frequent problems with tonsil infections. She has had recurrent episodes of tonsillitis several times per year and it is just becoming more frequent and more severe every time she gets it.   She has most of the difficulty in the w Right: Normal, Left: Normal. Pupil - Right: Normal, Left: Normal.    Ears Normal Inspection - Right: Normal, Left: Normal. Canal - Left: Normal. TM - Right: Normal, Left: Normal.      ASSESSMENT AND PLAN:   1.  Tonsillitis  Recurrent episodes of tonsillitis

## 2020-07-22 NOTE — BRIEF OP NOTE
Pre-Operative Diagnosis: Hypertrophy of tonsils alone [J35.1]     Post-Operative Diagnosis: Hypertrophy of tonsils alone [J35.1]      Procedure Performed:   Procedure(s):  Bilateral Tonsillectomy    Surgeon(s) and Role:     * Vickey Almeida MD - Primary

## 2020-07-22 NOTE — ANESTHESIA PREPROCEDURE EVALUATION
Anesthesia PreOp Note    HPI:     Sandip Pennington is a 22year old female who presents for preoperative consultation requested by: Smiley Pérez MD    Date of Surgery: 7/22/2020    Procedure(s):  TONSILLECTOMY  Indication: Hypertrophy of tonsils a Transportation needs:        Medical: Not on file        Non-medical: Not on file    Tobacco Use      Smoking status: Never Smoker      Smokeless tobacco: Never Used    Substance and Sexual Activity      Alcohol use: No        Alcohol/week: 0.0 standard dr FB  Neck ROM: full  Dental - normal exam     Pulmonary - negative ROS and normal exam   Cardiovascular - negative ROS and normal exam    Neuro/Psych - negative ROS     GI/Hepatic/Renal - negative ROS     Endo/Other - negative ROS   Abdominal  - normal exam

## 2020-07-22 NOTE — OPERATIVE REPORT
[facility]    PATIENT'S NAME: Marium Micah   ATTENDING PHYSICIAN: Srikanth Keller MD   OPERATING PHYSICIAN: Srikanth Keller MD   PATIENT ACCOUNT#:   [id number]    LOCATION:  [facility] [address] PACU [address] [place] [address]  MEDICAL RECORD #:   [id number]

## 2020-07-22 NOTE — INTERVAL H&P NOTE
Pre-op Diagnosis: Hypertrophy of tonsils alone [J35.1]    The above referenced H&P was reviewed by Sonia Arreguin. Eli Molina MD on 7/22/2020, the patient was examined and no significant changes have occurred in the patient's condition since the H&P was performed.   I

## 2020-07-22 NOTE — ANESTHESIA POSTPROCEDURE EVALUATION
Patient: Sandro Williamson    Procedure Summary     Date:  07/22/20 Room / Location:  [address] [place] [address] MAIN OR 03 / [address] [place] [address] OR    Anesthesia Start:  8277 Anesthesia Stop:  0500    Procedure:  TONSILLECTOMY (Bilateral Throat) Diagnosis:       Hypertrophy of tonsil

## 2020-07-23 ENCOUNTER — TELEPHONE (OUTPATIENT)
Dept: OTOLARYNGOLOGY | Facility: CLINIC | Age: 20
End: 2020-07-23

## 2020-07-27 ENCOUNTER — TELEPHONE (OUTPATIENT)
Dept: OTOLARYNGOLOGY | Facility: CLINIC | Age: 20
End: 2020-07-27

## 2020-07-27 RX ORDER — PREDNISOLONE 15 MG/5ML
SOLUTION ORAL
Qty: 70 ML | Refills: 0 | OUTPATIENT
Start: 2020-07-27

## 2020-07-27 NOTE — TELEPHONE ENCOUNTER
This pharmacy states they are out of hydrocodone rx - asking for it to be called into other [facility] - phone [phone]

## 2020-07-27 NOTE — TELEPHONE ENCOUNTER
Dr Cayetano Guidry please sign pending order for hydrocodone 7.5-325 mg /15ml,pt previous pharmacy don't have medication available,thanks. Rn updated pt pharmacy.

## 2020-07-28 NOTE — TELEPHONE ENCOUNTER
Rn spoke to pt to inform that [address] [facility] [address] ,pt verbalized understanding.

## 2020-07-30 ENCOUNTER — OFFICE VISIT (OUTPATIENT)
Dept: OTOLARYNGOLOGY | Facility: CLINIC | Age: 20
End: 2020-07-30
Payer: MEDICAID

## 2020-07-30 VITALS — HEIGHT: 64.6 IN | WEIGHT: 208 LBS | TEMPERATURE: 98 F | BODY MASS INDEX: 35.08 KG/M2

## 2020-07-30 DIAGNOSIS — J03.90 TONSILLITIS: Primary | ICD-10-CM

## 2020-07-30 PROCEDURE — 99024 POSTOP FOLLOW-UP VISIT: CPT | Performed by: OTOLARYNGOLOGY

## 2020-07-30 PROCEDURE — 3008F BODY MASS INDEX DOCD: CPT | Performed by: OTOLARYNGOLOGY

## 2020-07-30 NOTE — PROGRESS NOTES
She is still sore following her tonsillectomy but is slowly improving. Exam:  Pharynx healing well. Assessment/plan:  Doing well following her tonsillectomy. Recommend gradually increasing activity and diet. Return for any problems.

## 2022-08-19 ENCOUNTER — OFFICE VISIT (OUTPATIENT)
Dept: FAMILY MEDICINE CLINIC | Facility: CLINIC | Age: 22
End: 2022-08-19
Payer: COMMERCIAL

## 2022-08-19 VITALS
HEIGHT: 64 IN | RESPIRATION RATE: 16 BRPM | TEMPERATURE: 98 F | BODY MASS INDEX: 39.27 KG/M2 | WEIGHT: 230 LBS | OXYGEN SATURATION: 100 % | HEART RATE: 81 BPM | DIASTOLIC BLOOD PRESSURE: 77 MMHG | SYSTOLIC BLOOD PRESSURE: 127 MMHG

## 2022-08-19 DIAGNOSIS — N92.6 MISSED PERIOD: ICD-10-CM

## 2022-08-19 DIAGNOSIS — Z20.2 EXPOSURE TO GONORRHEA: Primary | ICD-10-CM

## 2022-08-19 LAB
CONTROL LINE PRESENT WITH A CLEAR BACKGROUND (YES/NO): YES YES/NO
PREGNANCY TEST, URINE: NEGATIVE

## 2022-08-19 PROCEDURE — 87808 TRICHOMONAS ASSAY W/OPTIC: CPT | Performed by: NURSE PRACTITIONER

## 2022-08-19 PROCEDURE — 87591 N.GONORRHOEAE DNA AMP PROB: CPT | Performed by: NURSE PRACTITIONER

## 2022-08-19 PROCEDURE — 87491 CHLMYD TRACH DNA AMP PROBE: CPT | Performed by: NURSE PRACTITIONER

## 2022-08-19 PROCEDURE — 87106 FUNGI IDENTIFICATION YEAST: CPT | Performed by: NURSE PRACTITIONER

## 2022-08-19 PROCEDURE — 87205 SMEAR GRAM STAIN: CPT | Performed by: NURSE PRACTITIONER

## 2022-08-19 RX ORDER — CEFTRIAXONE 500 MG/1
500 INJECTION, POWDER, FOR SOLUTION INTRAMUSCULAR; INTRAVENOUS ONCE
Status: COMPLETED | OUTPATIENT
Start: 2022-08-19 | End: 2022-08-19

## 2022-08-19 RX ADMIN — CEFTRIAXONE 500 MG: 500 INJECTION, POWDER, FOR SOLUTION INTRAMUSCULAR; INTRAVENOUS at 15:36:00

## 2022-08-20 DIAGNOSIS — B96.89 BACTERIAL VAGINOSIS: Primary | ICD-10-CM

## 2022-08-20 DIAGNOSIS — N76.0 BACTERIAL VAGINOSIS: Primary | ICD-10-CM

## 2022-08-20 RX ORDER — METRONIDAZOLE 500 MG/1
500 TABLET ORAL 2 TIMES DAILY
Qty: 14 TABLET | Refills: 0 | Status: SHIPPED | OUTPATIENT
Start: 2022-08-20 | End: 2022-08-27

## 2022-08-21 LAB
GENITAL VAGINOSIS SCREEN: POSITIVE
TRICHOMONAS SCREEN: NEGATIVE

## 2022-08-22 LAB
C TRACH DNA SPEC QL NAA+PROBE: NEGATIVE
N GONORRHOEA DNA SPEC QL NAA+PROBE: POSITIVE

## 2022-11-30 ENCOUNTER — LAB ENCOUNTER (OUTPATIENT)
Dept: LAB | Facility: REFERENCE LAB | Age: 22
End: 2022-11-30
Attending: FAMILY MEDICINE
Payer: MEDICAID

## 2022-11-30 ENCOUNTER — OFFICE VISIT (OUTPATIENT)
Dept: INTERNAL MEDICINE CLINIC | Facility: CLINIC | Age: 22
End: 2022-11-30
Payer: COMMERCIAL

## 2022-11-30 VITALS
WEIGHT: 237 LBS | HEIGHT: 64 IN | OXYGEN SATURATION: 99 % | BODY MASS INDEX: 40.46 KG/M2 | HEART RATE: 80 BPM | SYSTOLIC BLOOD PRESSURE: 124 MMHG | DIASTOLIC BLOOD PRESSURE: 80 MMHG

## 2022-11-30 DIAGNOSIS — Z00.00 PHYSICAL EXAM: Primary | ICD-10-CM

## 2022-11-30 DIAGNOSIS — Z86.19 HISTORY OF GONORRHEA: ICD-10-CM

## 2022-11-30 DIAGNOSIS — R11.0 NAUSEA: ICD-10-CM

## 2022-11-30 DIAGNOSIS — E66.01 OBESITY, MORBID, BMI 40.0-49.9 (HCC): ICD-10-CM

## 2022-11-30 LAB
ALBUMIN SERPL-MCNC: 3.6 G/DL (ref 3.4–5)
ALBUMIN/GLOB SERPL: 0.9 {RATIO} (ref 1–2)
ALP LIVER SERPL-CCNC: 58 U/L
ALT SERPL-CCNC: 20 U/L
ANION GAP SERPL CALC-SCNC: 7 MMOL/L (ref 0–18)
AST SERPL-CCNC: 8 U/L (ref 15–37)
BASOPHILS # BLD AUTO: 0.03 X10(3) UL (ref 0–0.2)
BASOPHILS NFR BLD AUTO: 0.5 %
BILIRUB SERPL-MCNC: 0.3 MG/DL (ref 0.1–2)
BUN BLD-MCNC: 15 MG/DL (ref 7–18)
BUN/CREAT SERPL: 18.3 (ref 10–20)
CALCIUM BLD-MCNC: 8.9 MG/DL (ref 8.5–10.1)
CHLORIDE SERPL-SCNC: 104 MMOL/L (ref 98–112)
CHOLEST SERPL-MCNC: 163 MG/DL (ref ?–200)
CO2 SERPL-SCNC: 27 MMOL/L (ref 21–32)
CONTROL LINE PRESENT WITH A CLEAR BACKGROUND (YES/NO): YES YES/NO
CREAT BLD-MCNC: 0.82 MG/DL
DEPRECATED RDW RBC AUTO: 43.6 FL (ref 35.1–46.3)
EOSINOPHIL # BLD AUTO: 0.09 X10(3) UL (ref 0–0.7)
EOSINOPHIL NFR BLD AUTO: 1.4 %
ERYTHROCYTE [DISTWIDTH] IN BLOOD BY AUTOMATED COUNT: 16.7 % (ref 11–15)
EST. AVERAGE GLUCOSE BLD GHB EST-MCNC: 114 MG/DL (ref 68–126)
FASTING PATIENT LIPID ANSWER: NO
FASTING STATUS PATIENT QL REPORTED: NO
GFR SERPLBLD BASED ON 1.73 SQ M-ARVRAT: 102 ML/MIN/1.73M2 (ref 60–?)
GLOBULIN PLAS-MCNC: 4.1 G/DL (ref 2.8–4.4)
GLUCOSE BLD-MCNC: 73 MG/DL (ref 70–99)
HBA1C MFR BLD: 5.6 % (ref ?–5.7)
HBV SURFACE AG SER-ACNC: <0.1 [IU]/L
HBV SURFACE AG SERPL QL IA: NONREACTIVE
HCT VFR BLD AUTO: 34.7 %
HDLC SERPL-MCNC: 64 MG/DL (ref 40–59)
HGB BLD-MCNC: 10.5 G/DL
IMM GRANULOCYTES # BLD AUTO: 0.02 X10(3) UL (ref 0–1)
IMM GRANULOCYTES NFR BLD: 0.3 %
LDLC SERPL CALC-MCNC: 89 MG/DL (ref ?–100)
LYMPHOCYTES # BLD AUTO: 2.28 X10(3) UL (ref 1–4)
LYMPHOCYTES NFR BLD AUTO: 34.3 %
MCH RBC QN AUTO: 21.8 PG (ref 26–34)
MCHC RBC AUTO-ENTMCNC: 30.3 G/DL (ref 31–37)
MCV RBC AUTO: 72 FL
MONOCYTES # BLD AUTO: 0.66 X10(3) UL (ref 0.1–1)
MONOCYTES NFR BLD AUTO: 9.9 %
NEUTROPHILS # BLD AUTO: 3.57 X10 (3) UL (ref 1.5–7.7)
NEUTROPHILS # BLD AUTO: 3.57 X10(3) UL (ref 1.5–7.7)
NEUTROPHILS NFR BLD AUTO: 53.6 %
NONHDLC SERPL-MCNC: 99 MG/DL (ref ?–130)
OSMOLALITY SERPL CALC.SUM OF ELEC: 285 MOSM/KG (ref 275–295)
PLATELET # BLD AUTO: 376 10(3)UL (ref 150–450)
POTASSIUM SERPL-SCNC: 3.7 MMOL/L (ref 3.5–5.1)
PREGNANCY TEST, URINE: NEGATIVE
PROT SERPL-MCNC: 7.7 G/DL (ref 6.4–8.2)
RBC # BLD AUTO: 4.82 X10(6)UL
SODIUM SERPL-SCNC: 138 MMOL/L (ref 136–145)
TRIGL SERPL-MCNC: 50 MG/DL (ref 30–149)
TSI SER-ACNC: 1.17 MIU/ML (ref 0.36–3.74)
VLDLC SERPL CALC-MCNC: 8 MG/DL (ref 0–30)
WBC # BLD AUTO: 6.7 X10(3) UL (ref 4–11)

## 2022-11-30 PROCEDURE — 83540 ASSAY OF IRON: CPT | Performed by: FAMILY MEDICINE

## 2022-11-30 PROCEDURE — 80061 LIPID PANEL: CPT | Performed by: FAMILY MEDICINE

## 2022-11-30 PROCEDURE — 84466 ASSAY OF TRANSFERRIN: CPT | Performed by: FAMILY MEDICINE

## 2022-11-30 PROCEDURE — 87340 HEPATITIS B SURFACE AG IA: CPT | Performed by: FAMILY MEDICINE

## 2022-11-30 PROCEDURE — 3079F DIAST BP 80-89 MM HG: CPT | Performed by: FAMILY MEDICINE

## 2022-11-30 PROCEDURE — 3008F BODY MASS INDEX DOCD: CPT | Performed by: FAMILY MEDICINE

## 2022-11-30 PROCEDURE — 99395 PREV VISIT EST AGE 18-39: CPT | Performed by: FAMILY MEDICINE

## 2022-11-30 PROCEDURE — 81025 URINE PREGNANCY TEST: CPT | Performed by: FAMILY MEDICINE

## 2022-11-30 PROCEDURE — 86780 TREPONEMA PALLIDUM: CPT | Performed by: FAMILY MEDICINE

## 2022-11-30 PROCEDURE — 84443 ASSAY THYROID STIM HORMONE: CPT | Performed by: FAMILY MEDICINE

## 2022-11-30 PROCEDURE — 3074F SYST BP LT 130 MM HG: CPT | Performed by: FAMILY MEDICINE

## 2022-11-30 PROCEDURE — 90651 9VHPV VACCINE 2/3 DOSE IM: CPT | Performed by: FAMILY MEDICINE

## 2022-11-30 PROCEDURE — 87389 HIV-1 AG W/HIV-1&-2 AB AG IA: CPT | Performed by: FAMILY MEDICINE

## 2022-11-30 PROCEDURE — 80053 COMPREHEN METABOLIC PANEL: CPT | Performed by: FAMILY MEDICINE

## 2022-11-30 PROCEDURE — 85025 COMPLETE CBC W/AUTO DIFF WBC: CPT | Performed by: FAMILY MEDICINE

## 2022-11-30 PROCEDURE — 83036 HEMOGLOBIN GLYCOSYLATED A1C: CPT | Performed by: FAMILY MEDICINE

## 2022-11-30 PROCEDURE — 36415 COLL VENOUS BLD VENIPUNCTURE: CPT | Performed by: FAMILY MEDICINE

## 2022-11-30 PROCEDURE — 90471 IMMUNIZATION ADMIN: CPT | Performed by: FAMILY MEDICINE

## 2022-11-30 RX ORDER — OMEPRAZOLE 20 MG/1
20 TABLET, DELAYED RELEASE ORAL
Qty: 30 TABLET | Refills: 1 | Status: SHIPPED | OUTPATIENT
Start: 2022-11-30

## 2022-11-30 NOTE — PATIENT INSTRUCTIONS
Recommendations on weight loss:  - Drink 8 glasses of water a day  - Do not drink your calories ( no regular pop, juice, high calorie coffee drinks, limit alcohol)  - Eat high protein meals and snacks  - Don't deprive yourself of food you like, just limit amount and make smart choices  - Write down everything you eat right away and think about why you are eating ( are you hungry, or are you eating because you are bored, tired, etc)  - Do not eat late at night  - Eat Breakfast  - Exercise- aim for 30 minutes 5 times a week of cardiac activity. Also strength training with light weights is helpful  - Weight yourself once a week first thing in the morning    Food advice:    1. Cut down your sugar consumption  2. Cut down refined grains/ carbs  3. Eat a good amount of protein and natural fats ( lean meats/avocado)  4. Increase natural fiber ( fruits/veggies)    Use regan LOSE IT or MY FITNESS PAL    Also consider weight watchers    [employer] resource: [employer]    Good books:    The Sethi Diet Solution ( helps with tips to stay motivated)  The Obesity Code and The Complete Guide to Intermittent Fasting - both about fasting and by Dr. Munir Lopez    Think about PLANNING WHAT YOUR EAT, EATING PROTEIN AND PLANT BASED

## 2022-12-01 DIAGNOSIS — D64.9 ANEMIA, UNSPECIFIED TYPE: Primary | ICD-10-CM

## 2022-12-01 LAB
IRON SATN MFR SERPL: 11 %
IRON SERPL-MCNC: 47 UG/DL
TIBC SERPL-MCNC: 444 UG/DL (ref 240–450)
TRANSFERRIN SERPL-MCNC: 298 MG/DL (ref 200–360)

## 2022-12-02 LAB — T PALLIDUM AB SER QL: NEGATIVE

## 2022-12-05 LAB
CHLAMYDIA TRACHOMATIS$RNA, TMA: NOT DETECTED
NEISSERIA GONORRHOEAE$RNA, TMA: NOT DETECTED

## 2023-06-23 ENCOUNTER — OFFICE VISIT (OUTPATIENT)
Dept: FAMILY MEDICINE CLINIC | Facility: CLINIC | Age: 23
End: 2023-06-23
Payer: COMMERCIAL

## 2023-06-23 DIAGNOSIS — Z11.1 TUBERCULOSIS SCREENING: Primary | ICD-10-CM

## 2023-06-23 PROCEDURE — 86580 TB INTRADERMAL TEST: CPT | Performed by: NURSE PRACTITIONER

## 2023-06-23 NOTE — PATIENT INSTRUCTIONS
You will need to return to clinic in 48-72 hours to have results of TB test read. Please return to clinic on SUN 6/25 after 930a or on MON 6/26 before 930am to have your TB test read. If you do not return during this time your test will need to be repeated. Our clinic hours are:  Monday-Friday        8:00 am to 7:30 pm  Saturday/Sunday    9:00 am to 4:30 pm    You can go to any of the [place] [id number] to have your TB test read.   To find your nearest [facility] go to [email or web address]

## 2023-06-26 ENCOUNTER — OFFICE VISIT (OUTPATIENT)
Dept: FAMILY MEDICINE CLINIC | Facility: CLINIC | Age: 23
End: 2023-06-26
Payer: MEDICAID

## 2023-06-26 DIAGNOSIS — Z23 NEED FOR VACCINATION: Primary | ICD-10-CM

## 2023-06-26 DIAGNOSIS — Z11.1 TUBERCULOSIS SCREENING: ICD-10-CM

## 2023-06-26 NOTE — PROGRESS NOTES
Patient presented for PPD reading. PPD was placed on 06/23/2023 at 9:30am. Informed patient that she was past the 72 hours and would not be able to read that PPD. New TB screening completed. New PPD placed in right forearm. Instructed to return between 06/28/2023 at 6:40pm and 06/29/2023 at 6:40pm.    Patient also needs TDaP vaccine. Vaccine screening completed and TDap given in left deltoid. Immunization record provided to patient. Patient to call PCP for physical form from physical completed in November 2022.

## 2023-06-28 ENCOUNTER — OFFICE VISIT (OUTPATIENT)
Dept: FAMILY MEDICINE CLINIC | Facility: CLINIC | Age: 23
End: 2023-06-28
Payer: MEDICAID

## 2023-06-28 DIAGNOSIS — Z11.1 SCREENING FOR TUBERCULOSIS: Primary | ICD-10-CM

## 2023-09-18 ENCOUNTER — OFFICE VISIT (OUTPATIENT)
Dept: INTERNAL MEDICINE CLINIC | Facility: CLINIC | Age: 23
End: 2023-09-18
Payer: MEDICAID

## 2023-09-18 VITALS
HEART RATE: 84 BPM | HEIGHT: 64 IN | OXYGEN SATURATION: 100 % | DIASTOLIC BLOOD PRESSURE: 70 MMHG | SYSTOLIC BLOOD PRESSURE: 120 MMHG | BODY MASS INDEX: 40.6 KG/M2 | WEIGHT: 237.81 LBS

## 2023-09-18 DIAGNOSIS — N76.0 BACTERIAL VAGINOSIS: ICD-10-CM

## 2023-09-18 DIAGNOSIS — N92.6 IRREGULAR PERIODS: ICD-10-CM

## 2023-09-18 DIAGNOSIS — Z86.19 HISTORY OF GONORRHEA: Primary | ICD-10-CM

## 2023-09-18 DIAGNOSIS — M54.31 SCIATICA OF RIGHT SIDE: ICD-10-CM

## 2023-09-18 DIAGNOSIS — B96.89 BACTERIAL VAGINOSIS: ICD-10-CM

## 2023-09-18 DIAGNOSIS — E66.01 OBESITY, MORBID, BMI 40.0-49.9 (HCC): ICD-10-CM

## 2023-09-18 DIAGNOSIS — K62.5 RECTAL BLEEDING: ICD-10-CM

## 2023-09-18 DIAGNOSIS — Z71.85 HPV VACCINE COUNSELING: ICD-10-CM

## 2023-09-18 PROCEDURE — 3008F BODY MASS INDEX DOCD: CPT | Performed by: FAMILY MEDICINE

## 2023-09-18 PROCEDURE — 3074F SYST BP LT 130 MM HG: CPT | Performed by: FAMILY MEDICINE

## 2023-09-18 PROCEDURE — 90471 IMMUNIZATION ADMIN: CPT | Performed by: FAMILY MEDICINE

## 2023-09-18 PROCEDURE — 99214 OFFICE O/P EST MOD 30 MIN: CPT | Performed by: FAMILY MEDICINE

## 2023-09-18 PROCEDURE — 90651 9VHPV VACCINE 2/3 DOSE IM: CPT | Performed by: FAMILY MEDICINE

## 2023-09-18 PROCEDURE — 87808 TRICHOMONAS ASSAY W/OPTIC: CPT | Performed by: FAMILY MEDICINE

## 2023-09-18 PROCEDURE — 87205 SMEAR GRAM STAIN: CPT | Performed by: FAMILY MEDICINE

## 2023-09-18 PROCEDURE — 3078F DIAST BP <80 MM HG: CPT | Performed by: FAMILY MEDICINE

## 2023-09-18 PROCEDURE — 87106 FUNGI IDENTIFICATION YEAST: CPT | Performed by: FAMILY MEDICINE

## 2023-09-18 RX ORDER — PHENTERMINE HYDROCHLORIDE 15 MG/1
15 CAPSULE ORAL EVERY MORNING
Qty: 30 CAPSULE | Refills: 0 | Status: SHIPPED | OUTPATIENT
Start: 2023-09-18

## 2023-09-20 ENCOUNTER — PATIENT MESSAGE (OUTPATIENT)
Dept: INTERNAL MEDICINE CLINIC | Facility: CLINIC | Age: 23
End: 2023-09-20

## 2023-09-21 LAB
GENITAL VAGINOSIS SCREEN: POSITIVE
TRICHOMONAS SCREEN: NEGATIVE

## 2023-10-02 ENCOUNTER — APPOINTMENT (OUTPATIENT)
Dept: CT IMAGING | Age: 23
End: 2023-10-02
Attending: EMERGENCY MEDICINE

## 2023-10-02 ENCOUNTER — HOSPITAL ENCOUNTER (EMERGENCY)
Age: 23
Discharge: HOME | End: 2023-10-02
Attending: EMERGENCY MEDICINE

## 2023-10-02 ENCOUNTER — APPOINTMENT (OUTPATIENT)
Dept: GENERAL RADIOLOGY | Age: 23
End: 2023-10-02
Attending: EMERGENCY MEDICINE

## 2023-10-02 VITALS
HEIGHT: 64 IN | WEIGHT: 227.07 LBS | RESPIRATION RATE: 18 BRPM | TEMPERATURE: 97.8 F | OXYGEN SATURATION: 100 % | DIASTOLIC BLOOD PRESSURE: 96 MMHG | HEART RATE: 87 BPM | SYSTOLIC BLOOD PRESSURE: 126 MMHG | BODY MASS INDEX: 38.77 KG/M2

## 2023-10-02 DIAGNOSIS — S00.81XA ABRASION OF FACE, INITIAL ENCOUNTER: ICD-10-CM
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
## 2023-10-02 LAB
ALBUMIN SERPL-MCNC: 4 G/DL (ref 3.6–5.1)
ALBUMIN/GLOB SERPL: 1 {RATIO} (ref 1–2.4)
ALP SERPL-CCNC: 54 UNITS/L (ref 45–117)
ALT SERPL-CCNC: 24 UNITS/L
ANION GAP SERPL CALC-SCNC: 8 MMOL/L (ref 7–19)
AST SERPL-CCNC: 17 UNITS/L
BASOPHILS # BLD: 0 K/MCL (ref 0–0.3)
BASOPHILS NFR BLD: 0 %
BILIRUB SERPL-MCNC: 0.3 MG/DL (ref 0.2–1)
BUN SERPL-MCNC: 18 MG/DL (ref 6–20)
BUN/CREAT SERPL: 21 (ref 7–25)
CALCIUM SERPL-MCNC: 9 MG/DL (ref 8.4–10.2)
CHLORIDE SERPL-SCNC: 106 MMOL/L (ref 97–110)
CO2 SERPL-SCNC: 26 MMOL/L (ref 21–32)
CREAT SERPL-MCNC: 0.85 MG/DL (ref 0.51–0.95)
DEPRECATED RDW RBC: 41.4 FL (ref 39–50)
EGFRCR SERPLBLD CKD-EPI 2021: >90 ML/MIN/{1.73_M2}
EOSINOPHIL # BLD: 0 K/MCL (ref 0–0.5)
EOSINOPHIL NFR BLD: 0 %
ERYTHROCYTE [DISTWIDTH] IN BLOOD: 16.5 % (ref 11–15)
FASTING DURATION TIME PATIENT: ABNORMAL H
GLOBULIN SER-MCNC: 4.2 G/DL (ref 2–4)
GLUCOSE SERPL-MCNC: 82 MG/DL (ref 70–99)
HCG SERPL QL: NEGATIVE
HCT VFR BLD CALC: 39 % (ref 36–46.5)
HGB BLD-MCNC: 11.7 G/DL (ref 12–15.5)
IMM GRANULOCYTES # BLD AUTO: 0 K/MCL (ref 0–0.2)
IMM GRANULOCYTES # BLD: 0 %
LIPASE SERPL-CCNC: 34 UNITS/L (ref 15–77)
LYMPHOCYTES # BLD: 1.7 K/MCL (ref 1–4.8)
LYMPHOCYTES NFR BLD: 19 %
MAGNESIUM SERPL-MCNC: 1.9 MG/DL (ref 1.7–2.4)
MCH RBC QN AUTO: 21.5 PG (ref 26–34)
MCHC RBC AUTO-ENTMCNC: 30 G/DL (ref 32–36.5)
MCV RBC AUTO: 71.8 FL (ref 78–100)
MONOCYTES # BLD: 0.7 K/MCL (ref 0.3–0.9)
MONOCYTES NFR BLD: 7 %
NEUTROPHILS # BLD: 6.8 K/MCL (ref 1.8–7.7)
NEUTROPHILS NFR BLD: 74 %
NRBC BLD MANUAL-RTO: 0 /100 WBC
PLATELET # BLD AUTO: 401 K/MCL (ref 140–450)
POTASSIUM SERPL-SCNC: 3.3 MMOL/L (ref 3.4–5.1)
PROT SERPL-MCNC: 8.2 G/DL (ref 6.4–8.2)
RBC # BLD: 5.43 MIL/MCL (ref 4–5.2)
SODIUM SERPL-SCNC: 137 MMOL/L (ref 135–145)
WBC # BLD: 9.3 K/MCL (ref 4.2–11)

## 2023-10-02 PROCEDURE — 74177 CT ABD & PELVIS W/CONTRAST: CPT

## 2023-10-02 PROCEDURE — 10002800 HB RX 250 W HCPCS: Performed by: EMERGENCY MEDICINE

## 2023-10-02 PROCEDURE — 70450 CT HEAD/BRAIN W/O DYE: CPT

## 2023-10-02 PROCEDURE — G1004 CDSM NDSC: HCPCS

## 2023-10-02 PROCEDURE — 10002803 HB RX 637: Performed by: EMERGENCY MEDICINE

## 2023-10-02 PROCEDURE — 96376 TX/PRO/DX INJ SAME DRUG ADON: CPT

## 2023-10-02 PROCEDURE — 80053 COMPREHEN METABOLIC PANEL: CPT | Performed by: EMERGENCY MEDICINE

## 2023-10-02 PROCEDURE — 83690 ASSAY OF LIPASE: CPT | Performed by: EMERGENCY MEDICINE

## 2023-10-02 PROCEDURE — 10002801 HB RX 250 W/O HCPCS: Performed by: EMERGENCY MEDICINE

## 2023-10-02 PROCEDURE — 99284 EMERGENCY DEPT VISIT MOD MDM: CPT

## 2023-10-02 PROCEDURE — 10002805 HB CONTRAST AGENT: Performed by: EMERGENCY MEDICINE

## 2023-10-02 PROCEDURE — 72125 CT NECK SPINE W/O DYE: CPT

## 2023-10-02 PROCEDURE — 73590 X-RAY EXAM OF LOWER LEG: CPT

## 2023-10-02 PROCEDURE — 96374 THER/PROPH/DIAG INJ IV PUSH: CPT

## 2023-10-02 PROCEDURE — 96375 TX/PRO/DX INJ NEW DRUG ADDON: CPT

## 2023-10-02 PROCEDURE — 84703 CHORIONIC GONADOTROPIN ASSAY: CPT | Performed by: EMERGENCY MEDICINE

## 2023-10-02 PROCEDURE — 73080 X-RAY EXAM OF ELBOW: CPT

## 2023-10-02 PROCEDURE — 73130 X-RAY EXAM OF HAND: CPT

## 2023-10-02 PROCEDURE — 71260 CT THORAX DX C+: CPT

## 2023-10-02 PROCEDURE — 83735 ASSAY OF MAGNESIUM: CPT | Performed by: EMERGENCY MEDICINE

## 2023-10-02 PROCEDURE — 73110 X-RAY EXAM OF WRIST: CPT

## 2023-10-02 PROCEDURE — 85025 COMPLETE CBC W/AUTO DIFF WBC: CPT | Performed by: EMERGENCY MEDICINE

## 2023-10-02 RX ORDER — ONDANSETRON 2 MG/ML
4 INJECTION INTRAMUSCULAR; INTRAVENOUS ONCE
Status: COMPLETED | OUTPATIENT
Start: 2023-10-02 | End: 2023-10-02

## 2023-10-02 RX ORDER — TETRACAINE HYDROCHLORIDE 5 MG/ML
1 SOLUTION OPHTHALMIC ONCE
Status: DISCONTINUED | OUTPATIENT
Start: 2023-10-02 | End: 2023-10-03 | Stop reason: HOSPADM

## 2023-10-02 RX ORDER — FLUCONAZOLE 150 MG/1
150 TABLET ORAL ONCE
Qty: 1 TABLET | Refills: 0 | Status: SHIPPED | OUTPATIENT
Start: 2023-10-02 | End: 2023-10-02

## 2023-10-02 RX ORDER — HYDROCODONE BITARTRATE AND ACETAMINOPHEN 5; 325 MG/1; MG/1
2 TABLET ORAL ONCE
Status: COMPLETED | OUTPATIENT
Start: 2023-10-02 | End: 2023-10-02

## 2023-10-02 RX ORDER — ACETAMINOPHEN 325 MG/1
650 TABLET ORAL EVERY 6 HOURS PRN
Qty: 30 TABLET | Refills: 0 | Status: SHIPPED | OUTPATIENT
Start: 2023-10-02

## 2023-10-02 RX ORDER — POTASSIUM CHLORIDE 20 MEQ/1
40 TABLET, EXTENDED RELEASE ORAL ONCE
Status: COMPLETED | OUTPATIENT
Start: 2023-10-02 | End: 2023-10-02

## 2023-10-02 RX ADMIN — POTASSIUM CHLORIDE 40 MEQ: 1500 TABLET, EXTENDED RELEASE ORAL at 22:45

## 2023-10-02 RX ADMIN — FENTANYL CITRATE 50 MCG: 50 INJECTION INTRAMUSCULAR; INTRAVENOUS at 18:38

## 2023-10-02 RX ADMIN — IOHEXOL 75 ML: 350 INJECTION, SOLUTION INTRAVENOUS at 20:28

## 2023-10-02 RX ADMIN — ONDANSETRON 4 MG: 2 INJECTION INTRAMUSCULAR; INTRAVENOUS at 18:42

## 2023-10-02 RX ADMIN — HYDROCODONE BITARTRATE AND ACETAMINOPHEN 2 TABLET: 5; 325 TABLET ORAL at 22:58

## 2023-10-02 RX ADMIN — FENTANYL CITRATE 50 MCG: 0.05 INJECTION, SOLUTION INTRAMUSCULAR; INTRAVENOUS at 19:59

## 2023-10-02 ASSESSMENT — PAIN SCALES - GENERAL: PAINLEVEL_OUTOF10: 8

## 2023-10-02 NOTE — TELEPHONE ENCOUNTER
From: Matthias Tay  To: Rolando Escoebdo  Sent: 9/20/2023 10:07 AM CDT  Subject: vaginitis results    Hi Nartica,    The preliminary lab results for the vaginitis panel is showing that you have bacterial vaginosis. I sent the medication to the pharmacy.  You cannot not drink alcohol with this medication    We will let you know when the final culture is back    Idalia Hernandez MD

## 2023-10-04 ENCOUNTER — NURSE TRIAGE (OUTPATIENT)
Dept: INTERNAL MEDICINE CLINIC | Facility: CLINIC | Age: 23
End: 2023-10-04

## 2023-10-04 ENCOUNTER — OFFICE VISIT (OUTPATIENT)
Dept: INTERNAL MEDICINE CLINIC | Facility: CLINIC | Age: 23
End: 2023-10-04
Payer: MEDICAID

## 2023-10-04 VITALS
WEIGHT: 232.81 LBS | BODY MASS INDEX: 39.75 KG/M2 | OXYGEN SATURATION: 98 % | HEIGHT: 64 IN | HEART RATE: 84 BPM | SYSTOLIC BLOOD PRESSURE: 100 MMHG | DIASTOLIC BLOOD PRESSURE: 64 MMHG

## 2023-10-04 DIAGNOSIS — T14.8XXA ABRASION OF SKIN: ICD-10-CM

## 2023-10-04 DIAGNOSIS — M54.6 ACUTE BILATERAL THORACIC BACK PAIN: ICD-10-CM
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
## 2023-10-04 DIAGNOSIS — M54.2 CERVICAL MUSCLE PAIN: ICD-10-CM

## 2023-10-04 DIAGNOSIS — H02.846 SWELLING OF GLAND OF LEFT EYELID: ICD-10-CM

## 2023-10-04 PROCEDURE — 3074F SYST BP LT 130 MM HG: CPT | Performed by: FAMILY MEDICINE

## 2023-10-04 PROCEDURE — 99214 OFFICE O/P EST MOD 30 MIN: CPT | Performed by: FAMILY MEDICINE

## 2023-10-04 PROCEDURE — 3078F DIAST BP <80 MM HG: CPT | Performed by: FAMILY MEDICINE

## 2023-10-04 PROCEDURE — 3008F BODY MASS INDEX DOCD: CPT | Performed by: FAMILY MEDICINE

## 2023-10-04 RX ORDER — IBUPROFEN 600 MG/1
600 TABLET ORAL EVERY 6 HOURS PRN
Qty: 40 TABLET | Refills: 1 | Status: SHIPPED | OUTPATIENT
Start: 2023-10-04

## 2023-10-04 RX ORDER — CYCLOBENZAPRINE HCL 10 MG
10 TABLET ORAL 3 TIMES DAILY
Qty: 30 TABLET | Refills: 1 | Status: SHIPPED | OUTPATIENT
Start: 2023-10-04 | End: 2023-11-03

## 2023-10-04 RX ORDER — METRONIDAZOLE 500 MG/1
500 TABLET ORAL 2 TIMES DAILY
COMMUNITY
Start: 2022-03-07

## 2023-10-04 RX ORDER — PHENTERMINE HCL 100 %
POWDER (GRAM) MISCELLANEOUS
COMMUNITY

## 2023-10-04 RX ORDER — ACETAMINOPHEN 325 MG/1
2 TABLET ORAL EVERY 6 HOURS PRN
COMMUNITY
Start: 2023-10-02

## 2023-10-04 RX ORDER — FERROUS SULFATE 325(65) MG
325 TABLET ORAL
COMMUNITY
Start: 2022-02-25

## 2023-10-04 RX ORDER — FLUCONAZOLE 150 MG/1
TABLET ORAL
COMMUNITY
Start: 2023-10-02
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
## 2023-10-25 ENCOUNTER — OFFICE VISIT (OUTPATIENT)
Dept: INTERNAL MEDICINE CLINIC | Facility: CLINIC | Age: 23
End: 2023-10-25

## 2023-10-25 VITALS
DIASTOLIC BLOOD PRESSURE: 70 MMHG | HEART RATE: 104 BPM | SYSTOLIC BLOOD PRESSURE: 116 MMHG | TEMPERATURE: 97 F | HEIGHT: 64 IN | OXYGEN SATURATION: 96 % | WEIGHT: 234.63 LBS | BODY MASS INDEX: 40.06 KG/M2

## 2023-10-25 DIAGNOSIS — S46.812D STRAIN OF LEFT TRAPEZIUS MUSCLE, SUBSEQUENT ENCOUNTER: Primary | ICD-10-CM

## 2023-10-25 DIAGNOSIS — M54.50 LUMBAR PAIN: ICD-10-CM

## 2023-10-25 PROCEDURE — 3008F BODY MASS INDEX DOCD: CPT

## 2023-10-25 PROCEDURE — 3074F SYST BP LT 130 MM HG: CPT

## 2023-10-25 PROCEDURE — 3078F DIAST BP <80 MM HG: CPT

## 2023-10-25 PROCEDURE — 99213 OFFICE O/P EST LOW 20 MIN: CPT

## 2023-11-16 ENCOUNTER — TELEPHONE (OUTPATIENT)
Dept: INTERNAL MEDICINE CLINIC | Facility: CLINIC | Age: 23
End: 2023-11-16

## 2023-12-04 ENCOUNTER — OFFICE VISIT (OUTPATIENT)
Dept: FAMILY MEDICINE CLINIC | Facility: CLINIC | Age: 23
End: 2023-12-04
Payer: MEDICAID

## 2023-12-04 VITALS
HEIGHT: 64.5 IN | SYSTOLIC BLOOD PRESSURE: 118 MMHG | RESPIRATION RATE: 20 BRPM | WEIGHT: 232.81 LBS | OXYGEN SATURATION: 97 % | TEMPERATURE: 98 F | HEART RATE: 85 BPM | DIASTOLIC BLOOD PRESSURE: 70 MMHG | BODY MASS INDEX: 39.26 KG/M2

## 2023-12-04 DIAGNOSIS — M54.2 CERVICAL MUSCLE PAIN: ICD-10-CM

## 2023-12-04 DIAGNOSIS — M54.6 ACUTE BILATERAL THORACIC BACK PAIN: ICD-10-CM

## 2023-12-04 DIAGNOSIS — J06.9 VIRAL URI: Primary | ICD-10-CM

## 2023-12-04 DIAGNOSIS — R09.81 NASAL CONGESTION: ICD-10-CM

## 2023-12-04 LAB
OPERATOR ID: NORMAL
POCT LOT NUMBER: NORMAL
RAPID SARS-COV-2 BY PCR: NOT DETECTED

## 2023-12-04 RX ORDER — IBUPROFEN 600 MG/1
600 TABLET ORAL EVERY 6 HOURS PRN
Qty: 30 TABLET | Refills: 0 | Status: SHIPPED | OUTPATIENT
Start: 2023-12-04

## 2023-12-04 RX ORDER — ACETAMINOPHEN 325 MG/1
650 TABLET ORAL EVERY 6 HOURS PRN
Qty: 30 TABLET | Refills: 0 | Status: SHIPPED | OUTPATIENT
Start: 2023-12-04

## 2023-12-04 NOTE — TELEPHONE ENCOUNTER
Spoke with patient to let her know her refills were sent to the pharmacy. And that she'll need to schedule an appointment. She said she will call back to schedule the appointment.

## 2023-12-04 NOTE — TELEPHONE ENCOUNTER
Future Appointment:none      Last Appointment:10/25/23      Last Refill:10/4/23      Medication Requested:   Requested Prescriptions     Pending Prescriptions Disp Refills    acetaminophen 325 MG Oral Tab  0     Sig: Take 2 tablets (650 mg total) by mouth every 6 (six) hours as needed. ibuprofen 600 MG Oral Tab 40 tablet 1     Sig: Take 1 tablet (600 mg total) by mouth every 6 (six) hours as needed for Pain.

## 2024-02-28 DIAGNOSIS — M54.2 CERVICAL MUSCLE PAIN: ICD-10-CM

## 2024-02-28 DIAGNOSIS — M54.6 ACUTE BILATERAL THORACIC BACK PAIN: ICD-10-CM

## 2024-02-28 DIAGNOSIS — E66.01 OBESITY, MORBID, BMI 40.0-49.9 (HCC): ICD-10-CM

## 2024-02-28 RX ORDER — PHENTERMINE HYDROCHLORIDE 15 MG/1
15 CAPSULE ORAL EVERY MORNING
Qty: 30 CAPSULE | Refills: 0 | OUTPATIENT
Start: 2024-02-28

## 2024-02-28 RX ORDER — FLUCONAZOLE 150 MG/1
TABLET ORAL
Refills: 0 | OUTPATIENT
Start: 2024-02-28

## 2024-02-28 RX ORDER — IBUPROFEN 600 MG/1
600 TABLET ORAL EVERY 6 HOURS PRN
Qty: 30 TABLET | Refills: 0 | Status: SHIPPED | OUTPATIENT
Start: 2024-02-28

## 2024-02-28 NOTE — TELEPHONE ENCOUNTER
Future Appointment:3/7/24      Last Appointment:10/25/23 w/Herm[facility]       Last Refill:10/2/23, 9/18/23      Medication Requested:   Phentermine HCl 15 MG Oral Cap          Sig: Take 1 capsule (15 mg total) by mouth every morning.    Disp: 30 capsule    Refills: 0    Start: 2/28/2024    Class: Normal    Non-formulary For: Obesity, morbid, BMI 40.0-49.9 (HCC)    Last ordered: 5 months ago (9/18/2023) by Jany Mcdermott MD    Controlled Substance Medication [id number]/28/2024 07:28 AM    This medication is a controlled substance - forward to provider to refill       fluconazole 150 MG Oral Tab         Sig: N/A    Disp: Not specified    Refills: 0    Start: 2/28/2024    Class: Normal    Non-formulary    Last ordered: 4 months ago (10/4/2023) by Reported External/Patient    Patient comment: Pretty sure j have snother yeast infection; starting to befome itchy.       To be filled at: [id number] [place] [id number] [place] [address] AT [facility] OF [address], [phone], [phone]

## 2024-03-07 ENCOUNTER — EKG ENCOUNTER (OUTPATIENT)
Dept: LAB | Facility: HOSPITAL | Age: 24
End: 2024-03-07
Attending: FAMILY MEDICINE
Payer: MEDICAID

## 2024-03-07 DIAGNOSIS — Z86.19 HISTORY OF GONORRHEA: ICD-10-CM

## 2024-03-07 DIAGNOSIS — E66.01 OBESITY, MORBID, BMI 40.0-49.9 (HCC): ICD-10-CM

## 2024-03-07 DIAGNOSIS — N92.6 IRREGULAR PERIODS: ICD-10-CM

## 2024-03-07 LAB
ATRIAL RATE: 66 BPM
BASOPHILS # BLD AUTO: 0.03 X10(3) UL (ref 0–0.2)
BASOPHILS NFR BLD AUTO: 0.5 %
C TRACH DNA SPEC QL NAA+PROBE: NEGATIVE
DEPRECATED RDW RBC AUTO: 42.2 FL (ref 35.1–46.3)
EOSINOPHIL # BLD AUTO: 0.13 X10(3) UL (ref 0–0.7)
EOSINOPHIL NFR BLD AUTO: 2.1 %
ERYTHROCYTE [DISTWIDTH] IN BLOOD BY AUTOMATED COUNT: 16.4 % (ref 11–15)
HCT VFR BLD AUTO: 35.7 %
HGB BLD-MCNC: 10.4 G/DL
IMM GRANULOCYTES # BLD AUTO: 0.01 X10(3) UL (ref 0–1)
IMM GRANULOCYTES NFR BLD: 0.2 %
IRON SATN MFR SERPL: 11 %
IRON SERPL-MCNC: 43 UG/DL
LYMPHOCYTES # BLD AUTO: 2.26 X10(3) UL (ref 1–4)
LYMPHOCYTES NFR BLD AUTO: 36.7 %
MCH RBC QN AUTO: 20.8 PG (ref 26–34)
MCHC RBC AUTO-ENTMCNC: 29.1 G/DL (ref 31–37)
MCV RBC AUTO: 71.4 FL
MONOCYTES # BLD AUTO: 0.68 X10(3) UL (ref 0.1–1)
MONOCYTES NFR BLD AUTO: 11 %
N GONORRHOEA DNA SPEC QL NAA+PROBE: NEGATIVE
NEUTROPHILS # BLD AUTO: 3.05 X10 (3) UL (ref 1.5–7.7)
NEUTROPHILS # BLD AUTO: 3.05 X10(3) UL (ref 1.5–7.7)
NEUTROPHILS NFR BLD AUTO: 49.5 %
P AXIS: 44 DEGREES
P-R INTERVAL: 136 MS
PLATELET # BLD AUTO: 469 10(3)UL (ref 150–450)
Q-T INTERVAL: 406 MS
QRS DURATION: 76 MS
QTC CALCULATION (BEZET): 425 MS
R AXIS: 20 DEGREES
RBC # BLD AUTO: 5 X10(6)UL
T AXIS: 12 DEGREES
TIBC SERPL-MCNC: 393 UG/DL (ref 250–425)
TRANSFERRIN SERPL-MCNC: 264 MG/DL (ref 250–380)
TSI SER-ACNC: 1.37 MIU/ML (ref 0.55–4.78)
VENTRICULAR RATE: 66 BPM
WBC # BLD AUTO: 6.2 X10(3) UL (ref 4–11)

## 2024-03-07 PROCEDURE — 85025 COMPLETE CBC W/AUTO DIFF WBC: CPT

## 2024-03-07 PROCEDURE — 83540 ASSAY OF IRON: CPT

## 2024-03-07 PROCEDURE — 87491 CHLMYD TRACH DNA AMP PROBE: CPT

## 2024-03-07 PROCEDURE — 84443 ASSAY THYROID STIM HORMONE: CPT

## 2024-03-07 PROCEDURE — 87591 N.GONORRHOEAE DNA AMP PROB: CPT

## 2024-03-07 PROCEDURE — 93010 ELECTROCARDIOGRAM REPORT: CPT | Performed by: STUDENT IN AN ORGANIZED HEALTH CARE EDUCATION/TRAINING PROGRAM

## 2024-03-07 PROCEDURE — 84466 ASSAY OF TRANSFERRIN: CPT

## 2024-03-07 PROCEDURE — 36415 COLL VENOUS BLD VENIPUNCTURE: CPT

## 2024-03-07 PROCEDURE — 93005 ELECTROCARDIOGRAM TRACING: CPT

## 2024-03-17 NOTE — PROGRESS NOTES
HPI:   Mariana Yeh is a 23 year old female who presents for a complete physical exam.   Last pap:  11/2  Menses:  normally regular, last one was a little late but came , last 4-5 days, day 2 heavy- changes tampon every 4 hours and pad also every 2, small clots if any  Sexually active  Contraception:  None currently but not trying to get pregnant   Exercise:  Yes, working with       RONNIE  Not on iron- had trouble tolerating but focusing on increasing iron in her diet   Energy ok   No black or bloody stools  No colon cancer in family        Eats healthy  Working with  3-4 times a week, an hour each time  Took phentermine 15 mg- did fine , no SE  Helped with appetite  Been off for at least 4 mos  Would like to try higher dose  No CP, SOB, palpitations, headache  Mood good  Sleeping well  Sometimes eats dinner late 930- can change this    Body fat is going down   Drinks Mostly water ,  some cranberry juice  More salads, less fast food   Minimal social alcohol    Wt Readings from Last 6 Encounters:   03/18/24 232 lb (105.2 kg)   12/04/23 232 lb 12.9 oz (105.6 kg)   10/25/23 234 lb 9.6 oz (106.4 kg)   10/04/23 232 lb 12.8 oz (105.6 kg)   09/18/23 237 lb 12.8 oz (107.9 kg)   11/30/22 237 lb (107.5 kg)     Body mass index is 39.82 kg/m².     Cholesterol, Total (mg/dL)   Date Value   11/30/2022 163   05/31/2018 169     HDL Cholesterol (mg/dL)   Date Value   11/30/2022 64 (H)   05/31/2018 45     LDL Cholesterol (mg/dL)   Date Value   11/30/2022 89   05/31/2018 113 (H)        Current Outpatient Medications   Medication Sig Dispense Refill    ibuprofen 600 MG Oral Tab Take 1 tablet (600 mg total) by mouth every 6 (six) hours as needed for Pain. 30 tablet 0    acetaminophen 325 MG Oral Tab Take 2 tablets (650 mg total) by mouth every 6 (six) hours as needed. (Patient not taking: Reported on 3/18/2024) 30 tablet 0    Phentermine HCl Does not apply Powder Take by mouth. (Patient not taking: Reported on  3/18/2024)      Ferrous Sulfate 325 (65 Fe) MG Oral Tab Take 1 tablet (325 mg total) by mouth daily with breakfast. (Patient not taking: Reported on 3/18/2024)      metRONIDAZOLE 500 MG Oral Tab Take 1 tablet (500 mg total) by mouth 2 (two) times daily. (Patient not taking: Reported on 3/18/2024)      fluconazole 150 MG Oral Tab  (Patient not taking: Reported on 3/18/2024)      metRONIDAZOLE 500 MG Oral Tab Take 1 tablet (500 mg total) by mouth 2 (two) times daily. (Patient not taking: Reported on 3/18/2024) 14 tablet 0    Phentermine HCl 15 MG Oral Cap Take 1 capsule (15 mg total) by mouth every morning. (Patient not taking: Reported on 3/18/2024) 30 capsule 0    ferrous sulfate 325 (65 FE) MG Oral Tab EC Take 1 tablet (325 mg total) by mouth 2 (two) times daily with meals. (Patient not taking: Reported on 3/18/2024) 180 tablet 1    docusate sodium 100 MG Oral Cap Take 1 capsule (100 mg total) by mouth nightly as needed for constipation. (Patient not taking: Reported on 3/18/2024) 90 capsule 0    Omeprazole Magnesium 20 MG Oral Tab EC Take 1 tablet (20 mg total) by mouth daily with breakfast. (Patient not taking: Reported on 3/18/2024) 30 tablet 1      No past medical history on file.   Past Surgical History:   Procedure Laterality Date    TONSILLECTOMY  07/22/2020      Family History   Problem Relation Age of Onset    Cancer Father     Cancer Mother     Hypertension Maternal Grandmother     Hypertension Maternal Grandfather       Social History:   Social History     Socioeconomic History    Marital status: Single   Tobacco Use    Smoking status: Never    Smokeless tobacco: Never   Vaping Use    Vaping Use: Never used   Substance and Sexual Activity    Alcohol use: No     Alcohol/week: 0.0 standard drinks of alcohol    Drug use: No    Sexual activity: Never          REVIEW OF SYSTEMS:   GENERAL: feels well otherwise  SKIN: denies any unusual skin lesions  LUNGS: denies shortness of breath  CARDIOVASCULAR: denies  chest pain  GI: denies abdominal pain,  No constipation or diarrhea  : denies dysuria, vaginal discharge or itching  NEURO: denies headaches  PSYCHE: denies depression or anxiety    EXAM:   /81   Pulse 79   Ht 5' 4\" (1.626 m)   Wt 232 lb (105.2 kg)   LMP 11/23/2023 (Exact Date)   SpO2 100%   BMI 39.82 kg/m²   Body mass index is 39.82 kg/m².   GENERAL: well developed, well nourished,in no apparent distress  SKIN: no rashes,no suspicious lesions  HEENT: atraumatic, normocephalic,  EYES:,conjunctiva are clear  NECK: supple,no adenopathy  BREAST: no dominant or suspicious mass, no nipple discharge  LUNGS: clear to auscultation  CARDIO: RRR without murmur  GI: good BS's,no masses, HSM or tenderness  EXTREMITIES: no edema      ASSESSMENT AND PLAN:   Mariana Yeh is a 23 year old female who presents for a complete physical exam.  Encounter Diagnoses   Name Primary?    Physical exam Yes    Obesity (BMI 30-39.9)      Discussed with patient pap smear guidelines and the importance of screening for cervical cancer  Discussed the importance of yearly mammograms starting at age 40 to help detect breast cancer.   Self breast exam explained and encouraged to perform monthly.   Health maintenance labs, recommend yearly: Lipids, CMP, and CBC.   Discussed importance of screening for colon cancer with colonoscopies starting at age 45, or sooner if high risk  Recommended low fat diet, low carb diet and regular aerobic exercise.    The patient indicates understanding of these issues and agrees to the plan.  The patient is asked to return for CPX in 1 yr.    1. Physical exam  2  - Comp Metabolic Panel (14); Future  - Lipid Panel; Future  - Hemoglobin A1C (Glycohemoglobin) [E]; Future    2. Obesity (BMI 30-39.9)  Pt counseled on importance of weight loss and exercise. Recommend 30 min of cardio activity 5 times a week. Advised small high protein meals and snacks throughout day. Avoid carbs and sugars. Increase water  and not to drink liquid calories. Pt given printed info on weight loss recommendations.   Will restart pt on phentermine. Discussed risks and benefits. Needs to continue diet and exercise for this to work. Follow up 2 month. If having side effects such as palpitations, pt to stop med and call office.  Use condoms if sexually active and stop medication right away if becomes pregnant- pt agrees   Work on less juice, read labels, avoid late night dinners   - Phentermine HCl 30 MG Oral Cap; Take 1 capsule (30 mg total) by mouth every morning.  Dispense: 30 capsule; Refill: 1    3. Iron deficiency anemia due to chronic blood loss  Discussed  2/2 to heavy periods   No fam hx colon cancer, no stool changes   Does not want rx iron replacement   Feels well  Recommend MVI with iron and increase iron in diet  F/u if feeling tired and recheck labs 3 mos       Orders Placed This Encounter   Procedures    Comp Metabolic Panel (14)    Lipid Panel    Hemoglobin A1C (Glycohemoglobin) [E]       Meds & Refills for this Visit:  Requested Prescriptions      No prescriptions requested or ordered in this encounter       Imaging & Consults:  None

## 2024-03-18 ENCOUNTER — OFFICE VISIT (OUTPATIENT)
Dept: INTERNAL MEDICINE CLINIC | Facility: CLINIC | Age: 24
End: 2024-03-18
Payer: MEDICAID

## 2024-03-18 VITALS
HEART RATE: 79 BPM | SYSTOLIC BLOOD PRESSURE: 123 MMHG | DIASTOLIC BLOOD PRESSURE: 81 MMHG | HEIGHT: 64 IN | WEIGHT: 232 LBS | BODY MASS INDEX: 39.61 KG/M2 | OXYGEN SATURATION: 100 %

## 2024-03-18 DIAGNOSIS — E66.9 OBESITY (BMI 30-39.9): ICD-10-CM

## 2024-03-18 DIAGNOSIS — D50.0 IRON DEFICIENCY ANEMIA DUE TO CHRONIC BLOOD LOSS: ICD-10-CM

## 2024-03-18 DIAGNOSIS — Z00.00 PHYSICAL EXAM: Primary | ICD-10-CM

## 2024-03-18 PROCEDURE — 99395 PREV VISIT EST AGE 18-39: CPT | Performed by: FAMILY MEDICINE

## 2024-03-18 RX ORDER — PHENTERMINE HYDROCHLORIDE 30 MG/1
30 CAPSULE ORAL EVERY MORNING
Qty: 30 CAPSULE | Refills: 1 | Status: SHIPPED | OUTPATIENT
Start: 2024-03-18

## 2024-03-18 NOTE — PATIENT INSTRUCTIONS
Recommendations on weight loss:    - Drink 8 glasses of water a day/ 64 oz  - Do not drink your calories ( no regular pop, juice, high calorie coffee drinks, limit alcohol)  - Eat high protein meals and snacks- aim for 15-30 gm of protein / meal and chose snacks that are high in protein ( ex hard boiled eggs, string cheese, cottage cheese, greek yogurt. Natural fats  and lean meats are also a good source of protein.  Limit carbs to 100 gm/ day. When choosing carbs chose healthy carbs ( fruit/ veggies/ whole grain options/ sweet potatoes). Dietdoctor.com is good resource for this.  - Don't deprive yourself of food you like, just limit amount and make smart choices  - Write down everything you eat for a few days- right away and think about why you are eating ( are you hungry, or are you eating because you are bored, tired, etc)- to get back on track or use Lose it Rachel  - Do not eat late at night  - Exercise- aim for 30 minutes 5 times a week of cardiac activity. Also strength training 2-3 times a week  30 minutes 5 times a week is recommended for weight maintenance, but for weight loss the goal is 300 minutes per/ week   - Weight yourself once a week first thing in the morning  -start taking fiber supplement daily- ex psyllium ( ex citracel, metamucil or generic psyllium ( can get at [employer] ex)). This helps keep stools regular, helps you feel full and can be good for the heart. Also increasing fiber in your diet is helpful.   Work on stress and increased sleep        1. Cut down your sugar consumption  2. Cut down refined grains/ carbs  3. Eat a good amount of protein and natural fats ( lean meats/avocado)  4. Increase natural fiber ( fruits/veggies) 5-6 servings / day      Nutritional Goals :           Calorie-controlled diet:  approx 1500 rosendo ( may be more or less depending on exercise), Limit carbohydrates to <100 gms per day, Protein goal of 100 gms per day.  Increase fiber to 25-35g     Use rachel LOSE IT or MY  FITNESS PAL to track calories  Goal to lose 1.5-2 lbs/ week     Take time to shop, read labels, plan healthy meals and snacks on the goal.  Protein shakes may help- example Premier protein or Orgain plant protein shake       Also consider weight watchers    Great resource: dietdoctor.com    Good books:   The Sethi Diet Solution ( helps with tips to stay motivated)  The Obesity Code and The Complete Guide to Intermittent Fasting - both about fasting and by Dr. Manuel Cam    Good recipes: [employer] blog     Podcast : strong as a working mom- Santa Bah MD. You don't need to be a working mom to find some of these tips helpful.    MY BEST ADVICE:  EAT LOW CARB AND SUGAR- looks at labels.   EAT HIGH PROTEIN TO FILL YOU UP  AND FOODS HIGH IN FIBER  EAT LESS PROCESSED FOODS/ MORE CLEAN EATING- this makes those two ideas above easier  EXERCISE FOR MOOD/ SLEEP/ENERGY / YOUR HEART/ AND HELP WITH WEIGHT LOSS . GET GOOD SLEEP.    IF YOU HAVE A BAD DAY, DON'T BEAT YOURSELF UP AND LOSE CONTROL. JUST GET BACK ON TRACK.

## 2024-05-06 ENCOUNTER — TELEMEDICINE (OUTPATIENT)
Dept: INTERNAL MEDICINE CLINIC | Facility: CLINIC | Age: 24
End: 2024-05-06
Payer: MEDICAID

## 2024-05-06 DIAGNOSIS — Z30.09 BIRTH CONTROL COUNSELING: Primary | ICD-10-CM

## 2024-05-06 RX ORDER — LEVONORGESTREL/ETHIN.ESTRADIOL 0.1-0.02MG
1 TABLET ORAL DAILY
Qty: 84 TABLET | Refills: 3 | Status: SHIPPED | OUTPATIENT
Start: 2024-05-06 | End: 2025-05-06

## 2024-05-06 NOTE — PROGRESS NOTES
Virtual VIDEO Check-In  Patient verbally consents to a Virtual/Telephone Check-In visit on 5/6/24  Patient understands and accepts financial responsibility for any deductible, co-insurance and/or co-pays associated with this service.    Duration of the service:  minutes    [place] is in [place]    CC:  No chief complaint on file.      Hx of CC:    Wants birth control  Wants patch- was on in HS  Is sexually active  Periods regular  LMP was April 17th     Nonsmoker  No migraine history          Allergies:  No Known Allergies   Current Meds:  Current Outpatient Medications   Medication Sig Dispense Refill    Phentermine HCl 30 MG Oral Cap Take 1 capsule (30 mg total) by mouth every morning. 30 capsule 1    ibuprofen 600 MG Oral Tab Take 1 tablet (600 mg total) by mouth every 6 (six) hours as needed for Pain. 30 tablet 0    acetaminophen 325 MG Oral Tab Take 2 tablets (650 mg total) by mouth every 6 (six) hours as needed. (Patient not taking: Reported on 3/18/2024) 30 tablet 0    Phentermine HCl Does not apply Powder Take by mouth. (Patient not taking: Reported on 3/18/2024)      Ferrous Sulfate 325 (65 Fe) MG Oral Tab Take 1 tablet (325 mg total) by mouth daily with breakfast. (Patient not taking: Reported on 3/18/2024)      metRONIDAZOLE 500 MG Oral Tab Take 1 tablet (500 mg total) by mouth 2 (two) times daily. (Patient not taking: Reported on 3/18/2024)      fluconazole 150 MG Oral Tab  (Patient not taking: Reported on 3/18/2024)      metRONIDAZOLE 500 MG Oral Tab Take 1 tablet (500 mg total) by mouth 2 (two) times daily. (Patient not taking: Reported on 3/18/2024) 14 tablet 0    Phentermine HCl 15 MG Oral Cap Take 1 capsule (15 mg total) by mouth every morning. (Patient not taking: Reported on 3/18/2024) 30 capsule 0    ferrous sulfate 325 (65 FE) MG Oral Tab EC Take 1 tablet (325 mg total) by mouth 2 (two) times daily with meals. (Patient not taking: Reported on 3/18/2024) 180 tablet 1    docusate sodium 100 MG Oral Cap  Take 1 capsule (100 mg total) by mouth nightly as needed for constipation. (Patient not taking: Reported on 3/18/2024) 90 capsule 0    Omeprazole Magnesium 20 MG Oral Tab EC Take 1 tablet (20 mg total) by mouth daily with breakfast. (Patient not taking: Reported on 3/18/2024) 30 tablet 1        History:  No past medical history on file.   Past Surgical History:   Procedure Laterality Date    Tonsillectomy  07/22/2020      Family History   Problem Relation Age of Onset    Cancer Father     Cancer Mother     Hypertension Maternal Grandmother     Hypertension Maternal Grandfather       Family Status   Relation Status    Fa (Not Specified)    Mo (Not Specified)    MGMA (Not Specified)    MGFA (Not Specified)      Social History     Socioeconomic History    Marital status: Single   Tobacco Use    Smoking status: Never    Smokeless tobacco: Never   Vaping Use    Vaping status: Never Used   Substance and Sexual Activity    Alcohol use: No     Alcohol/week: 0.0 standard drinks of alcohol    Drug use: No    Sexual activity: Never            ROS:  General:  feels well otherwise     Physical:    Phone visit     General:  Alert, appropriate, no acute distress  Pt speaking comfortably in full sentence  Psych: normal speech       Assessment and Plan:    1. Birth control counseling  Take medication as directed  Recommend pill over patch given BMI  Start Sunday after next period or take pregnancy test first   Give 2-3 mos to regulate. May have spotting in beginning.   Use condoms first month to prevent pregnancy and also in future to prevent STDs as pills do not prevent against this.      - Levonorgestrel-Ethinyl Estrad (LUTERA) 0.1-20 MG-MCG Oral Tab; Take 1 tablet by mouth daily.  Dispense: 84 tablet; Refill: 3    There are no diagnoses linked to this encounter.  None  No orders of the defined types were placed in this encounter.    VIDEO  visits are being conducted currently because of the coronavirus pandemic.    This has been  done in good emma to provide continuity of care in the best interest of the provider-patient relationship, due to the ongoing public health crisis/national emergency and because of restrictions of visitation.  There are limitations of this visit as no physical exam could be performed.  Every conscious effort was taken to allow for sufficient and adequate time.  This billing was spent on reviewing labs, medications, radiology tests and decision making.  Appropriate medical decision-making and tests are ordered as detailed in the plan of care above.  Pt to come in to the office for in person visit as soon as she is able to and exposure risk is less.

## 2024-05-18 NOTE — PROGRESS NOTES
CC:  No chief complaint on file.      Hx of CC:    Pt here for help with medically supported weight loss   And f/u RONNIE and ocps    Lost 8 lbs in 2 mos     Starting weight 3/18/24: 232   Today 224    Now on phentermine 30 mg x 1 mos  Thinks the medication helps a lot  More energy and not as hungry  No side effects     Eats healthy  Working with  3-4 times a week, an hour each time    No CP, SOB, palpitations, headache  Mood good  Sleeping well  Has cut down on sugar  Also tried intermittent fasting      Drinks Mostly water   More salads, less fast food   Protein shakes  Minimal social alcohol    Craves sweets: no  Anxiety/ depression: no      Hx RONNIE-   Not on iron  Couldn't tolerate     Wt Readings from Last 6 Encounters:   05/20/24 224 lb 12.8 oz (102 kg)   03/18/24 232 lb (105.2 kg)   12/04/23 232 lb 12.9 oz (105.6 kg)   10/25/23 234 lb 9.6 oz (106.4 kg)   10/04/23 232 lb 12.8 oz (105.6 kg)   09/18/23 237 lb 12.8 oz (107.9 kg)       Allergies:  No Known Allergies   Current Meds:  Current Outpatient Medications   Medication Sig Dispense Refill    metRONIDAZOLE 500 MG Oral Tab Take 1 tablet (500 mg total) by mouth 2 (two) times daily. 14 tablet 0    escitalopram 10 MG Oral Tab Take 1 tablet (10 mg total) by mouth daily. 90 tablet 1    ALBUTEROL 108 (90 Base) MCG/ACT Inhalation Aero Soln INHALE 2 PUFFS INTO THE LUNGS EVERY 6 HOURS AS NEEDED FOR WHEEZING (Patient not taking: Reported on 11/11/2022) 8.5 g 0    OMEGA-3-ACID ETHYL ESTERS 1 g Oral Cap TAKE 2 CAPSULES(2 GRAMS TOTAL) BY MOUTH TWICE DAILY (Patient not taking: No sig reported) 120 capsule 3        History:  Past Medical History:   Diagnosis Date    Anxiety     Binge eating disorder     Depression     Obesity     Obesity (BMI 30-39.9)       Past Surgical History:   Procedure Laterality Date    OTHER      breast augmentation    OTHER      tummy tuck      Family History   Problem Relation Age of Onset    Cancer Father         thyroid    High Cholesterol  Mother     Thyroid disease Sister     Breast Cancer Paternal Grandmother         70s    No Known Problems Other       Family Status   Relation Status    Fa (Not Specified)    Mo (Not Specified)    Sis (Not Specified)    PGMA (Not Specified)    Other (Not Specified)      Social History     Socioeconomic History    Marital status:    Tobacco Use    Smoking status: Former    Smokeless tobacco: Never   Vaping Use    Vaping status: Never Used   Substance and Sexual Activity    Alcohol use: No    Drug use: No            ROS:  General:  No fever, no fatigue, no weight changes  HEENT:  Denies congestion or nasal discharge  Cardio:  No chest pain or palpitations  Pulmonary:  No cough, no SOB  No headaches     Physical:  BP 98/64   Pulse 87   Ht 5' 4\" (1.626 m)   Wt 224 lb 12.8 oz (102 kg)   LMP 11/23/2023 (Exact Date)   SpO2 99%   BMI 38.59 kg/m²   Body mass index is 38.59 kg/m².    General:  Alert, appropriate, no acute distress  HEENT:  Normocephalic, supple. Moist mucus membranes.   Cardio:  RRR, no murmurs, S1, S2  Pulmonary:  Clear bilaterally, good air entry  EXT: no edema  MS: normal movement   NEURO: no gross deficits       Assessment and Plan:      1. Obesity (BMI 30-39.9)  Lost 8 lbs in 2 mos , on phentermine 30 mg x 1 month, will refill x 2 mos   Discussed risks and benefits. Needs to continue diet and exercise for this to work. Follow up 2 month. If having side effects such as palpitations, pt to stop med and call office.  Pt counseled on importance of weight loss and exercise. Recommend 30 min of cardio activity 5 times a week. Advised small high protein meals and snacks throughout day. Avoid carbs and sugars. Increase water and not to drink liquid calories. Pt given printed info on weight loss recommendations.     - Phentermine HCl 30 MG Oral Cap; Take 1 capsule (30 mg total) by mouth every morning.  Dispense: 30 capsule; Refill: 1    2. Iron deficiency anemia due to chronic blood loss  Rx ocps to  help with heavy periods  Could not tolerate oral iron.  Will recheck iron and CBC once has been on birth control for 3 months.    Weight loss medication history:  Recommend intensive lifestyle and behavioral modifications at this time for weight loss  Avoid processed, poor quality carbohydrates, refined grains, flour and sugar  Exercise goals:   Aim for 150 minutes of moderate level exercise weekly with 2-3 days of strength training    Behavior Modification:  Plan meals in advance.  Read nutrition labels      Drink 64 ounces of noncaloric beverages per day no fruit juices regular soda    Maintain  food journal    Utilize portion control strategies to reduce calorie intake  Identify triggers for eating  Try to eat slowly and sitting down. Aim for 20 to 30 minutes to complete a meal     Nutritional Goals :           Calorie-controlled diet: 1500 rosendo, Limit carbohydrates to <100 gms per day, Protein goal of 120-130 gms per day.  fiber  25-35g   Goals:  Keep a food log  Aim for 150 minutes of moderate exercise per week  Increase fruit and vegetable servings to 5 to 6/day  Improve sleep and stress  Weight loss printed instructions/ info given      Discussed medication options for weight loss in detail with patient    Denies personal or family history of medullary thyroid cancer, endocrine neoplasm syndrome, pancreatitis history, of suicidal ideation no renal impairment, severe GI disease, diabetes, pancreatitis risks noted  If any ever intolerable side effects- stop medication.  Agrees to risk associated with weight loss medication     Also discussed that in order for medications to work well, you need to also exercise and work hard on a healthy diet as above      Jessica Mcdermott MD  Diplomate  of the American Board of Family Medicine   Diplomate of the American Board of Obesity Medicine     There are no diagnoses linked to this encounter.  None  No orders of the defined types were placed in this encounter.

## 2024-05-20 ENCOUNTER — OFFICE VISIT (OUTPATIENT)
Dept: INTERNAL MEDICINE CLINIC | Facility: CLINIC | Age: 24
End: 2024-05-20

## 2024-05-20 VITALS
DIASTOLIC BLOOD PRESSURE: 64 MMHG | OXYGEN SATURATION: 99 % | HEIGHT: 64 IN | WEIGHT: 224.81 LBS | HEART RATE: 87 BPM | SYSTOLIC BLOOD PRESSURE: 98 MMHG | BODY MASS INDEX: 38.38 KG/M2

## 2024-05-20 DIAGNOSIS — E66.9 OBESITY (BMI 30-39.9): Primary | ICD-10-CM

## 2024-05-20 DIAGNOSIS — D50.0 IRON DEFICIENCY ANEMIA DUE TO CHRONIC BLOOD LOSS: ICD-10-CM

## 2024-05-20 PROCEDURE — 99214 OFFICE O/P EST MOD 30 MIN: CPT | Performed by: FAMILY MEDICINE

## 2024-05-20 RX ORDER — PHENTERMINE HYDROCHLORIDE 30 MG/1
30 CAPSULE ORAL EVERY MORNING
Qty: 30 CAPSULE | Refills: 1 | Status: SHIPPED | OUTPATIENT
Start: 2024-05-20

## 2024-05-20 NOTE — PATIENT INSTRUCTIONS
Recommendations on weight loss:    - Drink 8 glasses of water a day/ 64 oz  - Do not drink your calories ( no regular pop, juice, high calorie coffee drinks, limit alcohol)  - Eat high protein meals and snacks- aim for 15-30 gm of protein / meal and chose snacks that are high in protein ( ex hard boiled eggs, string cheese, cottage cheese, greek yogurt. Natural fats  and lean meats are also a good source of protein.  Limit carbs to 100 gm/ day. When choosing carbs chose healthy carbs ( fruit/ veggies/ whole grain options/ sweet potatoes). Dietdoctor.com is good resource for this.  - Don't deprive yourself of food you like, just limit amount and make smart choices  - Write down everything you eat for a few days- right away and think about why you are eating ( are you hungry, or are you eating because you are bored, tired, etc)- to get back on track or use Lose it Rachel  - Do not eat late at night  - Exercise- aim for 30 minutes 5 times a week of cardiac activity. Also strength training 2-3 times a week  30 minutes 5 times a week is recommended for weight maintenance, but for weight loss the goal is 300 minutes per/ week   - Weight yourself once a week first thing in the morning  -start taking fiber supplement daily- ex psyllium ( ex citracel, metamucil or generic psyllium ( can get at [employer] ex)). This helps keep stools regular, helps you feel full and can be good for the heart. Also increasing fiber in your diet is helpful.   Work on stress and increased sleep        1. Cut down your sugar consumption  2. Cut down refined grains/ carbs  3. Eat a good amount of protein and natural fats ( lean meats/avocado)  4. Increase natural fiber ( fruits/veggies) 5-6 servings / day      Nutritional Goals :           Calorie-controlled diet:  approx 1500 rosendo ( may be more or less depending on exercise), Limit carbohydrates to <100 gms per day, Protein goal of 100 gms per day.  Increase fiber to 25-35g     Use rachel LOSE IT or MY  FITNESS PAL to track calories  Goal to lose 1.5-2 lbs/ week     Take time to shop, read labels, plan healthy meals and snacks on the goal.  Protein shakes may help- example Premier protein or Orgain plant protein shake       Also consider weight watchers    Great resource: dietdoctor.com    Good books:   The Sethi Diet Solution ( helps with tips to stay motivated)  The Obesity Code and The Complete Guide to Intermittent Fasting - both about fasting and by Dr. Manuel Cam    Good recipes: [employer] blog     Podcast : strong as a working mom- Santa Bah MD. You don't need to be a working mom to find some of these tips helpful.    MY BEST ADVICE:  EAT LOW CARB AND SUGAR- looks at labels.   EAT HIGH PROTEIN TO FILL YOU UP  AND FOODS HIGH IN FIBER  EAT LESS PROCESSED FOODS/ MORE CLEAN EATING- this makes those two ideas above easier  EXERCISE FOR MOOD/ SLEEP/ENERGY / YOUR HEART/ AND HELP WITH WEIGHT LOSS . GET GOOD SLEEP.    IF YOU HAVE A BAD DAY, DON'T BEAT YOURSELF UP AND LOSE CONTROL. JUST GET BACK ON TRACK.

## 2024-07-22 ENCOUNTER — OFFICE VISIT (OUTPATIENT)
Dept: FAMILY MEDICINE CLINIC | Facility: CLINIC | Age: 24
End: 2024-07-22
Payer: MEDICAID

## 2024-07-22 VITALS
DIASTOLIC BLOOD PRESSURE: 72 MMHG | TEMPERATURE: 97 F | HEART RATE: 68 BPM | OXYGEN SATURATION: 98 % | SYSTOLIC BLOOD PRESSURE: 108 MMHG | WEIGHT: 215 LBS | BODY MASS INDEX: 36.7 KG/M2 | RESPIRATION RATE: 16 BRPM | HEIGHT: 64 IN

## 2024-07-22 DIAGNOSIS — N89.8 VAGINAL ODOR: Primary | ICD-10-CM

## 2024-07-22 DIAGNOSIS — Z11.3 SCREEN FOR STD (SEXUALLY TRANSMITTED DISEASE): ICD-10-CM

## 2024-07-22 DIAGNOSIS — Z20.822 CLOSE EXPOSURE TO COVID-19 VIRUS: ICD-10-CM

## 2024-07-22 LAB
CONTROL LINE PRESENT WITH A CLEAR BACKGROUND (YES/NO): YES YES/NO
KIT LOT #: NORMAL NUMERIC

## 2024-07-22 PROCEDURE — 81514 NFCT DS BV&VAGINITIS DNA ALG: CPT

## 2024-07-22 PROCEDURE — 87635 SARS-COV-2 COVID-19 AMP PRB: CPT

## 2024-07-22 PROCEDURE — 87591 N.GONORRHOEAE DNA AMP PROB: CPT

## 2024-07-22 PROCEDURE — 87491 CHLMYD TRACH DNA AMP PROBE: CPT

## 2024-07-22 NOTE — PROGRESS NOTES
CHIEF COMPLAINT:     Chief Complaint   Patient presents with    Asymptomic Covid Testing    Vaginal Problem       HPI:   Mariana Yeh is a 23 year old female who presents with symptoms of vaginal odor. Patient is requesting testing for STI, BV, and yeast infection.  Symptoms have been present for 7 days. Treatments tried: nothing prior to arrival.  Associated symptoms: none reported.  Patient denies flank pain, fever, hematuria, nausea, or vomiting. History of STI: Hx of gonorrhea and chlamydia. Sexual Activity: Patient denies new sexual partners in the last 3 months. Patient reports recent unprotected sexual intercourse. Genital discharge/itching:  Denies.    Patient also reports close exposure to COVID by coworker last week and requests testing. Patient is asymptomatic.      Current Outpatient Medications   Medication Sig Dispense Refill    Phentermine HCl 30 MG Oral Cap Take 1 capsule (30 mg total) by mouth every morning. 30 capsule 1    Levonorgestrel-Ethinyl Estrad (LUTERA) 0.1-20 MG-MCG Oral Tab Take 1 tablet by mouth daily. 84 tablet 3    Phentermine HCl 30 MG Oral Cap Take 1 capsule (30 mg total) by mouth every morning. 30 capsule 1    ibuprofen 600 MG Oral Tab Take 1 tablet (600 mg total) by mouth every 6 (six) hours as needed for Pain. 30 tablet 0    acetaminophen 325 MG Oral Tab Take 2 tablets (650 mg total) by mouth every 6 (six) hours as needed. (Patient not taking: Reported on 3/18/2024) 30 tablet 0    Phentermine HCl Does not apply Powder Take by mouth. (Patient not taking: Reported on 3/18/2024)      Ferrous Sulfate 325 (65 Fe) MG Oral Tab Take 1 tablet (325 mg total) by mouth daily with breakfast.      metRONIDAZOLE 500 MG Oral Tab Take 1 tablet (500 mg total) by mouth 2 (two) times daily. (Patient not taking: Reported on 3/18/2024)      fluconazole 150 MG Oral Tab  (Patient not taking: Reported on 3/18/2024)      metRONIDAZOLE 500 MG Oral Tab Take 1 tablet (500 mg total) by mouth 2  (two) times daily. (Patient not taking: Reported on 3/18/2024) 14 tablet 0    Phentermine HCl 15 MG Oral Cap Take 1 capsule (15 mg total) by mouth every morning. (Patient not taking: Reported on 3/18/2024) 30 capsule 0    ferrous sulfate 325 (65 FE) MG Oral Tab EC Take 1 tablet (325 mg total) by mouth 2 (two) times daily with meals. (Patient not taking: Reported on 3/18/2024) 180 tablet 1    docusate sodium 100 MG Oral Cap Take 1 capsule (100 mg total) by mouth nightly as needed for constipation. (Patient not taking: Reported on 3/18/2024) 90 capsule 0    Omeprazole Magnesium 20 MG Oral Tab EC Take 1 tablet (20 mg total) by mouth daily with breakfast. (Patient not taking: Reported on 3/18/2024) 30 tablet 1      History reviewed. No pertinent past medical history.   Social History:  Social History     Socioeconomic History    Marital status: Single   Tobacco Use    Smoking status: Never    Smokeless tobacco: Never   Vaping Use    Vaping status: Never Used   Substance and Sexual Activity    Alcohol use: No     Alcohol/week: 0.0 standard drinks of alcohol    Drug use: No    Sexual activity: Never         REVIEW OF SYSTEMS:   GENERAL: See above  GI: See HPI.    : See HPI.      EXAM:   /72   Pulse 68   Temp 97.4 °F (36.3 °C)   Resp 16   Ht 5' 4\" (1.626 m)   Wt 215 lb (97.5 kg)   LMP 06/24/2024 (Exact Date)   SpO2 98%   BMI 36.90 kg/m²   Physical Exam  Constitutional:       Appearance: Normal appearance.   HENT:      Head: Normocephalic and atraumatic.      Nose: Nose normal.      Mouth/Throat:      Mouth: Mucous membranes are moist.   Eyes:      Conjunctiva/sclera: Conjunctivae normal.   Cardiovascular:      Rate and Rhythm: Normal rate.   Pulmonary:      Effort: Pulmonary effort is normal. No respiratory distress.   Abdominal:      General: There is no distension.      Palpations: Abdomen is soft.      Tenderness: There is no abdominal tenderness. There is no right CVA tenderness or left CVA tenderness.    Genitourinary:     General: Normal vulva.      Exam position: Lithotomy position.      Pubic Area: No rash.       Az stage (genital): 5.      Vagina: Vaginal discharge (bloody) present.   Musculoskeletal:         General: Normal range of motion.      Cervical back: Normal range of motion.   Skin:     General: Skin is warm and dry.   Neurological:      General: No focal deficit present.      Mental Status: She is alert and oriented to person, place, and time.   Psychiatric:         Mood and Affect: Mood normal.         Behavior: Behavior normal.         Recent Results (from the past 24 hour(s))   Urine Preg Test    Collection Time: 07/22/24 12:32 PM   Result Value Ref Range    Pregnancy Test, Urine Neg     Control Line Present with a clear background (yes/no) yes Yes/No    Kit Lot # [id number] Numeric    Kit Expiration Date 4/8/25 Date         ASSESSMENT AND PLAN:   Mariana Yeh is a 23 year old female presents with urinary symptoms.    ASSESSMENT:  Encounter Diagnoses   Name Primary?    Screen for STD (sexually transmitted disease)     Close exposure to COVID-19 virus     Vaginal odor Yes         Orders Placed This Encounter   Procedures    Urine Preg Test    COVID-19 Testing by PCR ([facility]) [E]    Chlamydia/Gc Amplification [E]    Vaginitis Vaginosis PCR Panel         PLAN: Education provided.  Questions answered.  Reassurance given. Urine pregnancy test is negative. STI testing performed. COVID, CHGCP, and Vaginosis/Vaginitis Panel obtained and sent to lab; results pending and will treat according to results. Patient aware [facility]. Verbal instructions given for patient care. Patient will follow up with PCP and or OB/GYN if not improving and seek immediate care for worsening symptoms. Patient recommended to drink plenty of fluids and take OTC pain medications as needed. Patient instructions provided. The patient indicates understanding of these issues and agrees to the  plan.

## 2024-07-23 LAB
BV BACTERIA DNA VAG QL NAA+PROBE: POSITIVE
C GLABRATA DNA VAG QL NAA+PROBE: NEGATIVE
C KRUSEI DNA VAG QL NAA+PROBE: NEGATIVE
C TRACH DNA SPEC QL NAA+PROBE: NEGATIVE
CANDIDA DNA VAG QL NAA+PROBE: POSITIVE
N GONORRHOEA DNA SPEC QL NAA+PROBE: NEGATIVE
SARS-COV-2 RNA RESP QL NAA+PROBE: NOT DETECTED
T VAGINALIS DNA VAG QL NAA+PROBE: NEGATIVE

## 2024-07-24 DIAGNOSIS — B37.31 VAGINAL CANDIDA: ICD-10-CM

## 2024-07-24 DIAGNOSIS — N76.0 BACTERIAL VAGINOSIS: Primary | ICD-10-CM

## 2024-07-24 DIAGNOSIS — B96.89 BACTERIAL VAGINOSIS: Primary | ICD-10-CM

## 2024-07-24 RX ORDER — METRONIDAZOLE 500 MG/1
500 TABLET ORAL 2 TIMES DAILY
Qty: 14 TABLET | Refills: 0 | Status: SHIPPED | OUTPATIENT
Start: 2024-07-24 | End: 2024-07-31

## 2024-07-24 RX ORDER — FLUCONAZOLE 150 MG/1
150 TABLET ORAL ONCE
Qty: 1 TABLET | Refills: 0 | Status: SHIPPED | OUTPATIENT
Start: 2024-07-24 | End: 2024-07-24

## 2024-08-02 ENCOUNTER — OFFICE VISIT (OUTPATIENT)
Dept: FAMILY MEDICINE CLINIC | Facility: CLINIC | Age: 24
End: 2024-08-02
Payer: MEDICAID

## 2024-08-02 DIAGNOSIS — Z02.9 ADMINISTRATIVE ENCOUNTER: Primary | ICD-10-CM

## 2024-08-03 NOTE — PROGRESS NOTES
Patient arrived to complete work requirements including Tdap vaccine administration and TB skin testing.  Upon chart review, it was discussed with patient that she had both TB skin testing and TB vaccination administration in June of last year.  Patient contacted employer who stated that this met employment requirements.  Vaccine history printed for patient.  Patient verbalized understanding and questions were answered.  No further intervention required.

## 2024-08-05 ENCOUNTER — OFFICE VISIT (OUTPATIENT)
Dept: FAMILY MEDICINE CLINIC | Facility: CLINIC | Age: 24
End: 2024-08-05
Payer: MEDICAID

## 2024-08-05 DIAGNOSIS — Z11.1 VISIT FOR TB SKIN TEST: Primary | ICD-10-CM

## 2024-08-05 PROCEDURE — 86580 TB INTRADERMAL TEST: CPT | Performed by: NURSE PRACTITIONER

## 2024-08-05 NOTE — PATIENT INSTRUCTIONS
You will need to return to clinic in 48-72 hours to have results of TB test read.     Please return to clinic on 8/7/24 after 12:45PM or on 8/8/24 before 12:45PM to have your TB test read.    If you do not return during this time your test will need to be repeated.     Our clinic hours are:  Monday-Friday        8:00 am to 7:30 pm  Saturday/Sunday    9:00 am to 4:30 pm    You can go to any of the [facility] Walk-In Clinics to have your TB test read.  To find your nearest Walk-In Clinic go to [email or web address]

## 2024-08-05 NOTE — PROGRESS NOTES
Pt here for TB test. TB screening questionnaire completed and reviewed by provider. Given to RFA, pt tolerated well. Provided pt w instructions on when to RTC x read. Pt v/u.

## 2024-08-06 NOTE — PROGRESS NOTES
You will need to return to clinic in 48-72 hours to have results of TB test read.     Please return to clinic on 8/7/24 after 12:45PM or on 8/8/24 before 12:45PM to have your TB test read.    If you do not return during this time your test will need to be repeated.     Our clinic hours are:  Monday-Friday        8:00 am to 7:30 pm  Saturday/Sunday    9:00 am to 4:30 pm    You can go to any of the [facility] Walk-In Clinics to have your TB test read.  To find your nearest Walk-In Clinic go to [email or web address]

## 2024-08-07 ENCOUNTER — TELEPHONE (OUTPATIENT)
Dept: INTERNAL MEDICINE CLINIC | Facility: CLINIC | Age: 24
End: 2024-08-07

## 2024-08-07 ENCOUNTER — OFFICE VISIT (OUTPATIENT)
Dept: FAMILY MEDICINE CLINIC | Facility: CLINIC | Age: 24
End: 2024-08-07
Payer: MEDICAID

## 2024-08-07 DIAGNOSIS — Z11.1 ENCOUNTER FOR PPD SKIN TEST READING: Primary | ICD-10-CM

## 2024-08-07 LAB — INDURATION (): 0 MM (ref 0–11)

## 2024-08-07 NOTE — PROGRESS NOTES
Patient presents for tb read. RFA 0.0 mm induration, negative. Results given. Letter printed, signed and stamped. No q/c.

## 2024-08-26 DIAGNOSIS — Z30.09 BIRTH CONTROL COUNSELING: ICD-10-CM

## 2024-08-27 RX ORDER — LEVONORGESTREL/ETHIN.ESTRADIOL 0.1-0.02MG
1 TABLET ORAL DAILY
Qty: 84 TABLET | Refills: 0 | Status: SHIPPED | OUTPATIENT
Start: 2024-08-27 | End: 2025-08-27

## 2024-08-27 NOTE — TELEPHONE ENCOUNTER
Future Appointment:10/2/24      Last Appointment:5/20/24      Last Refill:5/6/24      Medication Requested:   Requested Prescriptions     Pending Prescriptions Disp Refills    Levonorgestrel-Ethinyl Estrad (LUTERA) 0.1-20 MG-MCG Oral Tab 84 tablet 3     Sig: Take 1 tablet by mouth daily.     Protocol :       Gynecology Medication Protocol [id number]/2024 07:35 PM    PASS-PENDING LAST PAP WNL--VIA MANUAL LOOKUP    Physical or Pelvic/Breast in past 12 or next 3 mos--VIA MANUAL LOOKUP

## 2024-11-08 NOTE — PROGRESS NOTES
CC:  No chief complaint on file.      Hx of CC:    Pt here for help with medically supported weight loss   And f/u RONNIE and ocps    Doing well on ocps  Periods lighter     Not doing well past 2 mos on diet and exercise  - hurt her back and not good diet, ready to start    Off phentermine x 2 mos but wants to restart   It did help  Took earlier this year    Starting weight 3/18/24: 232   Today 228        No CP, SOB, palpitations, headache  Mood good  Sleeping well  Has cut down on sugar     Drinks Mostly water   More salads, less fast food   Protein shakes  Minimal social alcohol    Craves sweets: no  Anxiety/ depression: no      Hx RONNIE- energy good  Not on iron  Couldn't tolerate     Wt Readings from Last 6 Encounters:   11/09/24 228 lb 6.4 oz (103.6 kg)   07/22/24 215 lb (97.5 kg)   05/20/24 224 lb 12.8 oz (102 kg)   03/18/24 232 lb (105.2 kg)   12/04/23 232 lb 12.9 oz (105.6 kg)   10/25/23 234 lb 9.6 oz (106.4 kg)       Allergies:  No Known Allergies   Current Meds:  Current Outpatient Medications   Medication Sig Dispense Refill    metRONIDAZOLE 500 MG Oral Tab Take 1 tablet (500 mg total) by mouth 2 (two) times daily. 14 tablet 0    escitalopram 10 MG Oral Tab Take 1 tablet (10 mg total) by mouth daily. 90 tablet 1    ALBUTEROL 108 (90 Base) MCG/ACT Inhalation Aero Soln INHALE 2 PUFFS INTO THE LUNGS EVERY 6 HOURS AS NEEDED FOR WHEEZING (Patient not taking: Reported on 11/11/2022) 8.5 g 0    OMEGA-3-ACID ETHYL ESTERS 1 g Oral Cap TAKE 2 CAPSULES(2 GRAMS TOTAL) BY MOUTH TWICE DAILY (Patient not taking: No sig reported) 120 capsule 3        History:  Past Medical History:   Diagnosis Date    Anxiety     Binge eating disorder     Depression     Obesity     Obesity (BMI 30-39.9)       Past Surgical History:   Procedure Laterality Date    OTHER      breast augmentation    OTHER      tummy tuck      Family History   Problem Relation Age of Onset    Cancer Father         thyroid    High Cholesterol Mother     Thyroid  disease Sister     Breast Cancer Paternal Grandmother         70s    No Known Problems Other       Family Status   Relation Status    Fa (Not Specified)    Mo (Not Specified)    Sis (Not Specified)    PGMA (Not Specified)    Other (Not Specified)      Social History     Socioeconomic History    Marital status:    Tobacco Use    Smoking status: Former    Smokeless tobacco: Never   Vaping Use    Vaping status: Never Used   Substance and Sexual Activity    Alcohol use: No    Drug use: No            ROS:  General:  No fever, no fatigue, no weight changes  HEENT:  Denies congestion or nasal discharge  Cardio:  No chest pain or palpitations  Pulmonary:  No cough, no SOB  No headaches     Physical:  /62   Pulse 75   Ht 5' 4\" (1.626 m)   Wt 228 lb 6.4 oz (103.6 kg)   LMP 06/24/2024 (Exact Date)   SpO2 98%   BMI 39.20 kg/m²   Body mass index is 39.2 kg/m².    General:  Alert, appropriate, no acute distress  HEENT:  Normocephalic, supple. Moist mucus membranes.   Cardio:  RRR, no murmurs, S1, S2  Pulmonary:  Clear bilaterally, good air entry  EXT: no edema  MS: normal movement   NEURO: no gross deficits       Assessment and Plan:        1. Obesity, morbid, BMI 40.0-49.9 ([facility])  Pt counseled on importance of weight loss and exercise. Recommend 30 min of cardio activity 5 times a week. Advised small high protein meals and snacks throughout day. Avoid carbs and sugars. Increase water and not to drink liquid calories. Pt given printed info on weight loss recommendations.   Restart phentermine for appetite suppressant  EKG on file  Will start pt on phentermine. Discussed risks and benefits. Needs to continue diet and exercise for this to work. Follow up 2 month. If having side effects such as palpitations, pt to stop med and call office. Discussed that use over 3 mos is off label but can continue if tolerating well and having success with weight loss    - Phentermine HCl 30 MG Oral Cap; Take 1 capsule (30 mg  total) by mouth every morning.  Dispense: 30 capsule; Refill: 1    2. Iron deficiency anemia due to chronic blood loss  Periods lighter on ocps  Check cbc   - CBC W Differential W Platelet [E]; Future        Weight loss medication history: phentermine 15 and 30 mg     Recommend intensive lifestyle and behavioral modifications at this time for weight loss  Avoid processed, poor quality carbohydrates, refined grains, flour and sugar  Exercise goals:   Aim for 150 minutes of moderate level exercise weekly with 2-3 days of strength training    Behavior Modification:  Plan meals in advance.  Read nutrition labels      Drink 64 ounces of noncaloric beverages per day no fruit juices regular soda    Maintain  food journal    Utilize portion control strategies to reduce calorie intake  Identify triggers for eating  Try to eat slowly and sitting down. Aim for 20 to 30 minutes to complete a meal     Nutritional Goals :           Calorie-controlled diet: 1500 rosendo, Limit carbohydrates to <100 gms per day, Protein goal of 120-130 gms per day.  fiber  25-35g   Goals:  Keep a food log  Aim for 150 minutes of moderate exercise per week  Increase fruit and vegetable servings to 5 to 6/day  Improve sleep and stress  Weight loss printed instructions/ info given      Discussed medication options for weight loss in detail with patient    Denies personal or family history of medullary thyroid cancer, endocrine neoplasm syndrome, pancreatitis history, of suicidal ideation no renal impairment, severe GI disease, diabetes, pancreatitis risks noted  If any ever intolerable side effects- stop medication.  Agrees to risk associated with weight loss medication     Also discussed that in order for medications to work well, you need to also exercise and work hard on a healthy diet as above      Jessica Mcdermott MD  Diplomate  of the American Board of Family Medicine   Diplomate of the American Board of Obesity Medicine     There are no diagnoses  linked to this encounter.  None  No orders of the defined types were placed in this encounter.

## 2024-11-09 ENCOUNTER — OFFICE VISIT (OUTPATIENT)
Dept: INTERNAL MEDICINE CLINIC | Facility: CLINIC | Age: 24
End: 2024-11-09
Payer: MEDICAID

## 2024-11-09 VITALS
HEART RATE: 75 BPM | WEIGHT: 228.38 LBS | HEIGHT: 64 IN | BODY MASS INDEX: 38.99 KG/M2 | DIASTOLIC BLOOD PRESSURE: 62 MMHG | SYSTOLIC BLOOD PRESSURE: 102 MMHG | OXYGEN SATURATION: 98 %

## 2024-11-09 DIAGNOSIS — D50.0 IRON DEFICIENCY ANEMIA DUE TO CHRONIC BLOOD LOSS: ICD-10-CM

## 2024-11-09 DIAGNOSIS — E66.01 OBESITY, MORBID, BMI 40.0-49.9 (HCC): Primary | ICD-10-CM

## 2024-11-09 PROCEDURE — 99214 OFFICE O/P EST MOD 30 MIN: CPT | Performed by: FAMILY MEDICINE

## 2024-11-09 RX ORDER — PHENTERMINE HYDROCHLORIDE 30 MG/1
30 CAPSULE ORAL EVERY MORNING
Qty: 30 CAPSULE | Refills: 1 | Status: SHIPPED | OUTPATIENT
Start: 2024-11-09

## 2024-11-09 NOTE — PATIENT INSTRUCTIONS
If I am prescribing weight loss medicine for you, you need to see me every 3 months for refills and for the best success!   The goal is not always weight loss, but better health. This can be measured with labs,  your vital signs and how you are feeling.  Please discuss any questions or challenges you are having with me.      Weight loss recommendations:    For diet:   Focus on less carbs and more protein in your diet.          Limit carbohydrates to <100 gms per day, Protein goal of 100 gms per day ( or 1.3-1.6 grams of protein per kilogram per day).  Increase fiber to 25-35g   To do this: cut down on sugar, carbs, increase protein and natural fats ( lean meats, avocado, eggs ), increase natural fiber with fruits and veggies   Good sources of protein:  Chicken, lean meat, salmon,  shrimp, eggs, quinoa, nut butter, almonds, nuts, lentils, black beans, greek yogurt, chickpeas , cottage cheese     When reading labels- look for high protein and if carbs, better to have higher fiber  with carbs so net carbs are lower.     Take time to shop, read labels, plan healthy meals and snacks on the goal.  Protein shakes may help- example Premier protein     - Drink 8 glasses of water a day/ 64 oz  - Do not drink your calories ( no regular pop, juice, high calorie coffee drinks, limit alcohol)  - Eat high protein meals and snacks- aim for 15-30 gm of protein / meal and chose snacks that are high in protein ( ex hard boiled eggs, string cheese, cottage cheese, greek yogurt. Natural fats  and lean meats are also a good source of protein.  Limit carbs to 100 gm/ day. When choosing carbs chose healthy carbs ( fruit/ veggies/ whole grain options/ sweet potatoes). Dietdoctor.com is good resource for this.  - Don't deprive yourself of food you like, just limit amount and make smart choices    Exercise:   - Exercise- aim for 30 minutes 5 times a week of cardiac activity. Also strength training 2-3 times a week  30 minutes 5 times a  week is recommended for weight maintenance, but for weight loss the goal is 300 minutes per/ week   Enduring Hydron Rachel  Even with those goals above, some exercise is better then none. If you only have 20 minutes in the morning, do it!     Behavior:   - Write down everything you eat for a few days- right away and think about why you are eating ( are you hungry, or are you eating because you are bored, tired, etc)- to get back on track or use Lose it Rachel  - Do not eat late at night  -work on stress and sleep.  - Weight yourself once a week first thing in the morning  Use rachel LOSE IT or MY FITNESS PAL to track calories  Calm rachel to help with stress     Supplements:  Vitamin D  -start taking fiber supplement daily- ex psyllium ( ex citracel, metamucil or generic psyllium ( can get at [employer] ex)). This helps keep stools regular, helps you feel full and can be good for the heart. Also increasing fiber in your diet is helpful.     Resources:       Healthy food info: dietdoctor.com    Good recipes: [email or web address] blog     Podcast : strong as a working mom- [place] MD. You don't need to be a working mom to find some of these tips helpful.        MY BEST ADVICE:  EAT LOW CARB AND LOW SUGAR- looks at labels.   EAT HIGH PROTEIN TO FILL YOU UP  AND FOODS HIGH IN FIBER  EAT LESS PROCESSED FOODS/ MORE CLEAN EATING- this makes those two ideas above easier  EXERCISE FOR MOOD/ SLEEP/ENERGY / YOUR HEART/ AND HELP WITH WEIGHT LOSS . GET GOOD SLEEP.    IF YOU HAVE A BAD DAY, DON'T BEAT YOURSELF UP AND LOSE CONTROL. JUST GET BACK ON TRACK.

## 2024-12-23 ENCOUNTER — OFFICE VISIT (OUTPATIENT)
Dept: FAMILY MEDICINE CLINIC | Facility: CLINIC | Age: 24
End: 2024-12-23

## 2024-12-23 DIAGNOSIS — Z11.1 ENCOUNTER FOR PPD TEST: Primary | ICD-10-CM

## 2024-12-26 ENCOUNTER — OFFICE VISIT (OUTPATIENT)
Dept: FAMILY MEDICINE CLINIC | Facility: CLINIC | Age: 24
End: 2024-12-26
Payer: MEDICAID

## 2024-12-26 DIAGNOSIS — Z11.1 ENCOUNTER FOR PPD SKIN TEST READING: Primary | ICD-10-CM

## 2024-12-26 LAB — INDURATION (): 0 MM (ref 0–11)

## 2024-12-26 NOTE — PROGRESS NOTES
Patient presents today for Tb skin test read. Negative. Form filled out for patient and copy made and sent to scanning.

## 2025-01-07 ENCOUNTER — OFFICE VISIT (OUTPATIENT)
Dept: FAMILY MEDICINE CLINIC | Facility: CLINIC | Age: 25
End: 2025-01-07
Payer: MEDICAID

## 2025-01-07 VITALS
WEIGHT: 230 LBS | BODY MASS INDEX: 39 KG/M2 | TEMPERATURE: 98 F | RESPIRATION RATE: 16 BRPM | DIASTOLIC BLOOD PRESSURE: 72 MMHG | OXYGEN SATURATION: 99 % | HEART RATE: 74 BPM | SYSTOLIC BLOOD PRESSURE: 128 MMHG

## 2025-01-07 DIAGNOSIS — J11.1 INFLUENZA-LIKE ILLNESS: Primary | ICD-10-CM

## 2025-01-07 LAB
CONTROL LINE PRESENT WITH A CLEAR BACKGROUND (YES/NO): YES YES/NO
STREP GRP A CUL-SCR: NEGATIVE

## 2025-01-07 PROCEDURE — 87637 SARSCOV2&INF A&B&RSV AMP PRB: CPT | Performed by: NURSE PRACTITIONER

## 2025-01-07 PROCEDURE — 99213 OFFICE O/P EST LOW 20 MIN: CPT | Performed by: NURSE PRACTITIONER

## 2025-01-07 PROCEDURE — 87880 STREP A ASSAY W/OPTIC: CPT | Performed by: NURSE PRACTITIONER

## 2025-01-07 NOTE — PROGRESS NOTES
HPI:   Mariana Yeh is a 24 year old female who presents with ill symptoms for  3  days. Patient reports nasal congestion, cough, body aches, loss of appetite. Vomited up food this morning. Notes low grade fever to 100. Has tried tea with honey and otc cold/cough medication with mild intermittent relief. 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    Current Outpatient Medications   Medication Sig Dispense Refill    Phentermine HCl 30 MG Oral Cap Take 1 capsule (30 mg total) by mouth every morning. 30 capsule 1    Levonorgestrel-Ethinyl Estrad (LUTERA) 0.1-20 MG-MCG Oral Tab Take 1 tablet by mouth daily. 84 tablet 0    Phentermine HCl 30 MG Oral Cap Take 1 capsule (30 mg total) by mouth every morning. 30 capsule 1     No current facility-administered medications for this visit.      No past medical history on file.   Past Surgical History:   Procedure Laterality Date    Tonsillectomy  07/22/2020      Family History   Problem Relation Age of Onset    Cancer Father     Cancer Mother     Hypertension Maternal Grandmother     Hypertension Maternal Grandfather       Social History     Socioeconomic History    Marital status: Single   Tobacco Use    Smoking status: Never    Smokeless tobacco: Never   Vaping Use    Vaping status: Never Used   Substance and Sexual Activity    Alcohol use: No     Alcohol/week: 0.0 standard drinks of alcohol    Drug use: No    Sexual activity: Never         REVIEW OF SYSTEMS:   GENERAL: feels well otherwise, mildly fatigued  HEENT: congested, as above in HPI  LUNGS: denies shortness of breath with exertion, cough as above  CARDIOVASCULAR: denies chest pain on exertion  GI: no nausea, vomiting up liquid x 1 today, no abdominal pain, appetite low  NEURO: denies current headaches    EXAM:   /72   Pulse 74   Temp 98.4 °F (36.9 °C) (Oral)   Resp 16   Wt 230 lb (104.3 kg)   LMP 12/27/2024 (Exact Date)   SpO2 99%   BMI 39.48 kg/m²   GENERAL: well developed, well nourished,in no  apparent distress, appears congested but non toxic appearing  HEENT: atraumatic, normocephalic,ears full and clear, nares with mild rhinorrhea, throat pink, tonsils absent. Uvuvla midline.    NECK: supple,no adenopathy  LUNGS: clear to auscultation, breathing is non labored  CARDIO: RRR without murmur  Results for orders placed or performed in visit on 01/07/25   Strep A Assay W/Optic    Collection Time: 01/07/25  1:22 PM   Result Value Ref Range    Strep Grp A Screen negative Negative    Control Line Present with a clear background (yes/no) yes Yes/No    Kit Lot # [id number] Numeric    Kit Expiration Date 11/13/25 Date         ASSESSMENT AND PLAN:    PLAN:Mariana was seen today for fever.    Diagnoses and all orders for this visit:    Influenza-like illness  -     SARS-CoV-2/Flu A and B/RSV by PCR ([facility])  -     Strep A Assay W/Optic      Negative rapid strep. Viral panel sent. Note for return to work with improvement provided. Self care discussed. Medication use and risk/benefit discussed. Patient is advised to follow up with PCP if not improving with treatment plan or seek immediate care if symptoms worsen.  The patient indicates understanding of these issues and agrees to the plan.  Patient Instructions   Self care for viral illnesses:  Saline nasal spray such as Ocean or Simply Saline to nostrils 2-3 times daily to help soothe tissues and keep mucus thin.  Salt water gargles (1 tsp. Salt in 6 oz lukewarm water), use several times daily to help remove post nasal drainage and soothe throat.  Hydrate! (cold or hot based on comfort). Drink lots of water or other non dehydrating liquids to help with illness.   May use humidifier in bedroom at night to help with congestion. Hot steamy showers can loosen congestion and help cough. Danielito's vapor rub to chest can quiet cough.  Hand washing-use hand  or wash hands frequently, cover your cough or sneeze, do not share towels or drinks with others.  May take over  the counter multi symptom medication as directed on package if needed.  May use Tylenol or Ibuprofen over the counter for pain/comfort if not included in multi symptom medication.  No work or school until fever free for 24 hours.  Follow up in 10-14 days after illness started with primary care if symptoms not complete resolved or sooner if worsening symptoms. Seek immediate care if inability to swallow or breathe or if symptoms improve greatly then worsen again.

## 2025-01-07 NOTE — PATIENT INSTRUCTIONS
Self care for viral illnesses:  Saline nasal spray such as Ocean or Simply Saline to nostrils 2-3 times daily to help soothe tissues and keep mucus thin.  Salt water gargles (1 tsp. Salt in 6 oz lukewarm water), use several times daily to help remove post nasal drainage and soothe throat.  Hydrate! (cold or hot based on comfort). Drink lots of water or other non dehydrating liquids to help with illness.   May use humidifier in bedroom at night to help with congestion. Hot steamy showers can loosen congestion and help cough. Danielito's vapor rub to chest can quiet cough.  Hand washing-use hand  or wash hands frequently, cover your cough or sneeze, do not share towels or drinks with others.  May take over the counter multi symptom medication as directed on package if needed.  May use Tylenol or Ibuprofen over the counter for pain/comfort if not included in multi symptom medication.  No work or school until fever free for 24 hours.  Follow up in 10-14 days after illness started with primary care if symptoms not complete resolved or sooner if worsening symptoms. Seek immediate care if inability to swallow or breathe or if symptoms improve greatly then worsen again.

## 2025-01-08 LAB
FLUAV + FLUBV RNA SPEC NAA+PROBE: NOT DETECTED
FLUAV + FLUBV RNA SPEC NAA+PROBE: NOT DETECTED
RSV RNA SPEC NAA+PROBE: NOT DETECTED
SARS-COV-2 RNA RESP QL NAA+PROBE: NOT DETECTED

## 2025-03-30 ENCOUNTER — OFFICE VISIT (OUTPATIENT)
Dept: FAMILY MEDICINE CLINIC | Facility: CLINIC | Age: 25
End: 2025-03-30
Payer: MEDICAID

## 2025-03-30 VITALS
OXYGEN SATURATION: 97 % | BODY MASS INDEX: 39.09 KG/M2 | RESPIRATION RATE: 16 BRPM | HEART RATE: 76 BPM | WEIGHT: 229 LBS | HEIGHT: 64 IN | DIASTOLIC BLOOD PRESSURE: 70 MMHG | TEMPERATURE: 98 F | SYSTOLIC BLOOD PRESSURE: 102 MMHG

## 2025-03-30 DIAGNOSIS — Z11.3 SCREENING EXAMINATION FOR STI: Primary | ICD-10-CM

## 2025-03-30 PROCEDURE — 87491 CHLMYD TRACH DNA AMP PROBE: CPT | Performed by: FAMILY MEDICINE

## 2025-03-30 PROCEDURE — 87591 N.GONORRHOEAE DNA AMP PROB: CPT | Performed by: FAMILY MEDICINE

## 2025-03-30 NOTE — PROGRESS NOTES
Mariana Yeh is a 24 year old female.    S:  Patient presents today with the following concerns:  Chief Complaint   Patient presents with    STD     Pt wants std testing; no current symptoms    She denies vaginal symptoms or urinary symptoms.  States she like to have STI testing twice a year.  Does have a new sexual partner.  Usually uses condoms.  Due for menses to start in next couple of days.    Denies history of painful sores or bumps in the genital region.     Current Outpatient Medications   Medication Sig Dispense Refill    Phentermine HCl 30 MG Oral Cap Take 1 capsule (30 mg total) by mouth every morning. 30 capsule 1    Levonorgestrel-Ethinyl Estrad (LUTERA) 0.1-20 MG-MCG Oral Tab Take 1 tablet by mouth daily. 84 tablet 0    Phentermine HCl 30 MG Oral Cap Take 1 capsule (30 mg total) by mouth every morning. 30 capsule 1     Patient Active Problem List   Diagnosis    Closed fracture of ilium (HCC)    Chronic tonsillitis    Iron deficiency anemia due to chronic blood loss     Family History   Problem Relation Age of Onset    Cancer Father     Cancer Mother     Hypertension Maternal Grandmother     Hypertension Maternal Grandfather        REVIEW OF SYSTEMS:  GENERAL: feels well otherwise  SKIN: denies any unusual skin lesions  EYES:denies vision change  LUNGS: denies shortness of breath with exertion  CARDIOVASCULAR: denies chest pain on exertion  GI: denies abdominal pain.  No N/V/D/C  : denies dysuria  MUSCULOSKELETAL: denies back pain  NEURO: denies headaches    EXAM:  /70   Pulse 76   Temp 98.2 °F (36.8 °C) (Oral)   Resp 16   Ht 5' 4\" (1.626 m)   Wt 229 lb (103.9 kg)   LMP 03/06/2025 (Exact Date)   SpO2 97%   BMI 39.31 kg/m²   Physical Exam  Constitutional:       General: She is not in acute distress.     Appearance: Normal appearance. She is not ill-appearing, toxic-appearing or diaphoretic.   HENT:      Head: Normocephalic and atraumatic.   Eyes:      Extraocular Movements:  Extraocular movements intact.      Conjunctiva/sclera: Conjunctivae normal.      Pupils: Pupils are equal, round, and reactive to light.   Cardiovascular:      Rate and Rhythm: Regular rhythm.      Heart sounds: Normal heart sounds.   Pulmonary:      Effort: Pulmonary effort is normal.   Abdominal:      General: Bowel sounds are normal. There is no distension.      Palpations: Abdomen is soft. There is no mass.      Tenderness: There is no abdominal tenderness. There is no right CVA tenderness, left CVA tenderness, guarding or rebound.   Skin:     General: Skin is warm and dry.   Neurological:      General: No focal deficit present.      Mental Status: She is alert and oriented to person, place, and time.   Psychiatric:         Mood and Affect: Mood normal.         Behavior: Behavior normal.        ASSESSMENT AND PLAN:  Mariana Yhe is a 24 year old female.  Encounter Diagnosis   Name Primary?    Screening examination for STI Yes       No results found.     Orders Placed This Encounter   Procedures    Chlamydia/Gc Amplification     Meds & Refills for this Visit:  Requested Prescriptions      No prescriptions requested or ordered in this encounter     Imaging & Consults:  None  Urine gonorrhea and chlamydia testing collected today.  Discussed with her that she should have HIV and RPR testing.  We do not do this in the walk in clinic/immediate cares.  She should contact her PCP regarding this.    Also encouraged her to get regular pap smears to screen for HPV/cervical cancer.      Encouraged condom use.    Discussed symptoms of chlamydia, gonorrhea, herpes.      Follow up here as needed.      Patient verbalizes understanding of plan.  No follow-ups on file.

## 2025-03-31 LAB
C TRACH DNA SPEC QL NAA+PROBE: NEGATIVE
N GONORRHOEA DNA SPEC QL NAA+PROBE: NEGATIVE

## 2025-05-10 DIAGNOSIS — E66.01 OBESITY, MORBID, BMI 40.0-49.9 (HCC): ICD-10-CM

## 2025-05-12 RX ORDER — PHENTERMINE HYDROCHLORIDE 30 MG/1
30 CAPSULE ORAL EVERY MORNING
Qty: 30 CAPSULE | Refills: 0 | OUTPATIENT
Start: 2025-05-12

## 2025-07-22 NOTE — PROGRESS NOTES
CC:  No chief complaint on file.      Hx of CC:    Pt here for help with medically supported weight loss and wants STD testing    STD testing- new partner  On ocps    Wants to work on her weight again  Last on phentermine over a year ago  It did help  On ocps  Today 230 lbs     Back to exercising - going to gym 3  days a week       No CP, SOB, palpitations, headache  Mood good  Sleeping well  Has cut down on sugar     Drinks Mostly water   Eats only 1-2 times a day.   Not big snacker  More salads, less fast food   Protein shakes  Minimal social alcohol    Craves sweets: no  Anxiety/ depression: no      Hx RONNIE- energy good  Not on iron  Couldn't tolerate     Wt Readings from Last 6 Encounters:   07/24/25 230 lb (104.3 kg)   03/30/25 229 lb (103.9 kg)   01/07/25 230 lb (104.3 kg)   11/09/24 228 lb 6.4 oz (103.6 kg)   07/22/24 215 lb (97.5 kg)   05/20/24 224 lb 12.8 oz (102 kg)       Allergies:  No Known Allergies   Current Meds:  Current Outpatient Medications   Medication Sig Dispense Refill    metRONIDAZOLE 500 MG Oral Tab Take 1 tablet (500 mg total) by mouth 2 (two) times daily. 14 tablet 0    escitalopram 10 MG Oral Tab Take 1 tablet (10 mg total) by mouth daily. 90 tablet 1    ALBUTEROL 108 (90 Base) MCG/ACT Inhalation Aero Soln INHALE 2 PUFFS INTO THE LUNGS EVERY 6 HOURS AS NEEDED FOR WHEEZING (Patient not taking: Reported on 11/11/2022) 8.5 g 0    OMEGA-3-ACID ETHYL ESTERS 1 g Oral Cap TAKE 2 CAPSULES(2 GRAMS TOTAL) BY MOUTH TWICE DAILY (Patient not taking: No sig reported) 120 capsule 3        History:  Past Medical History:   Diagnosis Date    Anxiety     Binge eating disorder     Depression     Obesity     Obesity (BMI 30-39.9)       Past Surgical History:   Procedure Laterality Date    OTHER      breast augmentation    OTHER      tummy charlieck      Family History   Problem Relation Age of Onset    Cancer Father         thyroid    High Cholesterol Mother     Thyroid disease Sister     Breast Cancer Paternal  Grandmother         70s    No Known Problems Other       Family Status   Relation Status    Fa (Not Specified)    Mo (Not Specified)    Sis (Not Specified)    PGMA (Not Specified)    Other (Not Specified)      Social History     Socioeconomic History    Marital status:    Tobacco Use    Smoking status: Former    Smokeless tobacco: Never   Vaping Use    Vaping status: Never Used   Substance and Sexual Activity    Alcohol use: No    Drug use: No            ROS:  General:  No fever, no fatigue, no weight changes  HEENT:  Denies congestion or nasal discharge  Cardio:  No chest pain or palpitations  Pulmonary:  No cough, no SOB  No headaches     Physical:  /78   Pulse 74   Ht 5' 4\" (1.626 m)   Wt 230 lb (104.3 kg)   LMP 03/06/2025 (Exact Date)   SpO2 99%   BMI 39.48 kg/m²   Body mass index is 39.48 kg/m².    General:  Alert, appropriate, no acute distress  HEENT:  Normocephalic, supple. Moist mucus membranes.   Cardio:  RRR, no murmurs, S1, S2  Pulmonary:  Clear bilaterally, good air entry  EXT: no edema  MS: normal movement   NEURO: no gross deficits       Assessment and Plan:    1. Screening examination for STD (sexually transmitted disease)  Is on ocps  Discussed safe sex/ using condoms  - HIV Ag/Ab Combo; Future  - Hepatitis B Surface Antigen; Future  - Chlamydia/Gc Amplification; Future  - T Pallidum Screening Cascade; Future    2. Obesity (BMI 30-39.9)  Will restart phentermine for short period.  She did well on it in the past.  Pt counseled on importance of weight loss and exercise. Recommend 30 min of cardio activity 5 times a week. Advised small high protein meals and snacks throughout day. Avoid carbs and sugars. Increase water and not to drink liquid calories. Pt given printed info on weight loss recommendations.   Restart phentermine for appetite suppressant  EKG on file  Will start pt on phentermine. Discussed risks and benefits. Needs to continue diet and exercise for this to work. Follow  up 2 month. If having side effects such as palpitations, pt to stop med and call office.   She needs To be on birth control when on this medication is can get pregnant while taking.  Patient voiced understanding  - Phentermine HCl 30 MG Oral Cap; Take 1 capsule (30 mg total) by mouth every morning.  Dispense: 30 capsule; Refill: 1    3. Physical exam  Check labs prior to PE   - CBC With Differential With Platelet; Future  - Comp Metabolic Panel (14); Future  - TSH W Reflex To Free T4; Future  - Lipid Panel; Future  - Iron And Tibc [E]; Future  - Hemoglobin A1C (Glycohemoglobin) [E]; Future    Weight loss medication history: phentermine 15 and 30 mg     Recommend intensive lifestyle and behavioral modifications at this time for weight loss  Avoid processed, poor quality carbohydrates, refined grains, flour and sugar  Exercise goals:   Aim for 150 minutes of moderate level exercise weekly with 2-3 days of strength training    Behavior Modification:  Plan meals in advance.  Read nutrition labels      Drink 64 ounces of noncaloric beverages per day no fruit juices regular soda    Maintain  food journal    Utilize portion control strategies to reduce calorie intake  Identify triggers for eating  Try to eat slowly and sitting down. Aim for 20 to 30 minutes to complete a meal     Nutritional Goals :           Calorie-controlled diet: 1500 rosendo, Limit carbohydrates to <100 gms per day, Protein goal of 120-130 gms per day.  fiber  25-35g   Goals:  Keep a food log  Aim for 150 minutes of moderate exercise per week  Increase fruit and vegetable servings to 5 to 6/day  Improve sleep and stress  Weight loss printed instructions/ info given      Discussed medication options for weight loss in detail with patient    Denies personal or family history of medullary thyroid cancer, endocrine neoplasm syndrome, pancreatitis history, of suicidal ideation no renal impairment, severe GI disease, diabetes, pancreatitis risks noted  If any  ever intolerable side effects- stop medication.  Agrees to risk associated with weight loss medication     Also discussed that in order for medications to work well, you need to also exercise and work hard on a healthy diet as above      Jessica Mcdermott MD  Diplomate  of the American Board of Family Medicine   Diplomate of the American Board of Obesity Medicine     There are no diagnoses linked to this encounter.  None  No orders of the defined types were placed in this encounter.

## 2025-07-24 ENCOUNTER — OFFICE VISIT (OUTPATIENT)
Dept: INTERNAL MEDICINE CLINIC | Facility: CLINIC | Age: 25
End: 2025-07-24
Payer: MEDICAID

## 2025-07-24 VITALS
SYSTOLIC BLOOD PRESSURE: 112 MMHG | HEIGHT: 64 IN | OXYGEN SATURATION: 99 % | HEART RATE: 74 BPM | WEIGHT: 230 LBS | DIASTOLIC BLOOD PRESSURE: 78 MMHG | BODY MASS INDEX: 39.27 KG/M2

## 2025-07-24 DIAGNOSIS — Z11.3 SCREENING EXAMINATION FOR STD (SEXUALLY TRANSMITTED DISEASE): Primary | ICD-10-CM

## 2025-07-24 DIAGNOSIS — E66.9 OBESITY (BMI 30-39.9): ICD-10-CM

## 2025-07-24 DIAGNOSIS — Z00.00 PHYSICAL EXAM: ICD-10-CM

## 2025-07-24 RX ORDER — PHENTERMINE HYDROCHLORIDE 30 MG/1
30 CAPSULE ORAL EVERY MORNING
Qty: 30 CAPSULE | Refills: 1 | Status: SHIPPED | OUTPATIENT
Start: 2025-07-24

## 2025-07-24 NOTE — PATIENT INSTRUCTIONS
If I am prescribing weight loss medicine for you, you need to see me every 3 months for refills and for the best success!   The goal is not always weight loss, but better health. This can be measured with labs,  your vital signs and how you are feeling.  Please discuss any questions or challenges you are having with me.      Weight loss recommendations:    For diet:   Focus on less carbs and more protein in your diet.          Limit carbohydrates to <100 gms per day, Protein goal of 100 gms per day ( or 1.3-1.6 grams of protein per kilogram per day).  Increase fiber to 25-35g   To do this: cut down on sugar, carbs, increase protein and natural fats ( lean meats, avocado, eggs ), increase natural fiber with fruits and veggies   Good sources of protein:  Chicken, lean meat, salmon,  shrimp, eggs, quinoa, nut butter, almonds, nuts, lentils, black beans, greek yogurt, chickpeas , cottage cheese     When reading labels- look for high protein and if carbs, better to have higher fiber  with carbs so net carbs are lower.     Take time to shop, read labels, plan healthy meals and snacks on the goal.  Protein shakes may help- example Premier protein     - Drink 8 glasses of water a day/ 64 oz  - Do not drink your calories ( no regular pop, juice, high calorie coffee drinks, limit alcohol)  - Eat high protein meals and snacks- aim for 15-30 gm of protein / meal and chose snacks that are high in protein ( ex hard boiled eggs, string cheese, cottage cheese, greek yogurt. Natural fats  and lean meats are also a good source of protein.  Limit carbs to 100 gm/ day. When choosing carbs chose healthy carbs ( fruit/ veggies/ whole grain options/ sweet potatoes). Dietdoctor.com is good resource for this.  - Don't deprive yourself of food you like, just limit amount and make smart choices    Exercise:   - Exercise- aim for 30 minutes 5 times a week of cardiac activity. Also strength training 2-3 times a week  30 minutes 5 times a week  is recommended for weight maintenance, but for weight loss the goal is 300 minutes per/ week   [employer] Rachel  Even with those goals above, some exercise is better then none. If you only have 20 minutes in the morning, do it!     Behavior:   - Write down everything you eat for a few days- right away and think about why you are eating ( are you hungry, or are you eating because you are bored, tired, etc)- to get back on track or use Lose it Rachel  - Do not eat late at night  -work on stress and sleep.  - Weight yourself once a week first thing in the morning  Use rachel LOSE IT or MY FITNESS PAL to track calories  Calm rachel to help with stress     Supplements:  Vitamin D  -start taking fiber supplement daily- ex psyllium ( ex citracel, metamucil or generic psyllium ( can get at [employer] ex)). This helps keep stools regular, helps you feel full and can be good for the heart. Also increasing fiber in your diet is helpful.     Resources:       Healthy food info: dietdoctor.com    Good recipes: skinsarkis blog     Podcast : strong as a working mom- [place] MD. You don't need to be a working mom to find some of these tips helpful.        MY BEST ADVICE:  EAT LOW CARB AND LOW SUGAR- looks at labels.   EAT HIGH PROTEIN TO FILL YOU UP  AND FOODS HIGH IN FIBER  EAT LESS PROCESSED FOODS/ MORE CLEAN EATING- this makes those two ideas above easier  EXERCISE FOR MOOD/ SLEEP/ENERGY / YOUR HEART/ AND HELP WITH WEIGHT LOSS . GET GOOD SLEEP.    IF YOU HAVE A BAD DAY, DON'T BEAT YOURSELF UP AND LOSE CONTROL. JUST GET BACK ON TRACK.                  \

## 2025-07-25 ENCOUNTER — LAB ENCOUNTER (OUTPATIENT)
Dept: LAB | Facility: HOSPITAL | Age: 25
End: 2025-07-25
Attending: FAMILY MEDICINE
Payer: MEDICAID

## 2025-07-25 DIAGNOSIS — Z11.3 SCREENING EXAMINATION FOR STD (SEXUALLY TRANSMITTED DISEASE): ICD-10-CM

## 2025-07-25 DIAGNOSIS — Z00.00 PHYSICAL EXAM: ICD-10-CM

## 2025-07-25 LAB
ALBUMIN SERPL-MCNC: 4.8 G/DL (ref 3.2–4.8)
ALBUMIN/GLOB SERPL: 1.7 (ref 1–2)
ALP LIVER SERPL-CCNC: 45 U/L (ref 37–98)
ALT SERPL-CCNC: 13 U/L (ref 10–49)
ANION GAP SERPL CALC-SCNC: 8 MMOL/L (ref 0–18)
AST SERPL-CCNC: 13 U/L (ref ?–34)
BASOPHILS # BLD AUTO: 0.03 X10(3) UL (ref 0–0.2)
BASOPHILS NFR BLD AUTO: 0.3 %
BILIRUB SERPL-MCNC: 0.3 MG/DL (ref 0.3–1.2)
BUN BLD-MCNC: 12 MG/DL (ref 9–23)
BUN/CREAT SERPL: 10.6 (ref 10–20)
CALCIUM BLD-MCNC: 9.1 MG/DL (ref 8.7–10.4)
CHLORIDE SERPL-SCNC: 106 MMOL/L (ref 98–112)
CHOLEST SERPL-MCNC: 176 MG/DL (ref ?–200)
CO2 SERPL-SCNC: 25 MMOL/L (ref 21–32)
CREAT BLD-MCNC: 1.13 MG/DL (ref 0.55–1.02)
DEPRECATED RDW RBC AUTO: 39.5 FL (ref 35.1–46.3)
EGFRCR SERPLBLD CKD-EPI 2021: 69 ML/MIN/1.73M2 (ref 60–?)
EOSINOPHIL # BLD AUTO: 0.09 X10(3) UL (ref 0–0.7)
EOSINOPHIL NFR BLD AUTO: 1 %
ERYTHROCYTE [DISTWIDTH] IN BLOOD BY AUTOMATED COUNT: 14.9 % (ref 11–15)
EST. AVERAGE GLUCOSE BLD GHB EST-MCNC: 123 MG/DL (ref 68–126)
FASTING PATIENT LIPID ANSWER: YES
FASTING STATUS PATIENT QL REPORTED: YES
GLOBULIN PLAS-MCNC: 2.8 G/DL (ref 2–3.5)
GLUCOSE BLD-MCNC: 81 MG/DL (ref 70–99)
HBA1C MFR BLD: 5.9 % (ref ?–5.7)
HBV SURFACE AG SER-ACNC: 0.12
HBV SURFACE AG SERPL QL IA: NONREACTIVE
HCT VFR BLD AUTO: 38.1 % (ref 35–48)
HDLC SERPL-MCNC: 52 MG/DL (ref 40–59)
HGB BLD-MCNC: 11.7 G/DL (ref 12–16)
IMM GRANULOCYTES # BLD AUTO: 0.03 X10(3) UL (ref 0–1)
IMM GRANULOCYTES NFR BLD: 0.3 %
IRON SATN MFR SERPL: 13 % (ref 15–50)
IRON SERPL-MCNC: 50 UG/DL (ref 50–170)
LDLC SERPL CALC-MCNC: 112 MG/DL (ref ?–100)
LYMPHOCYTES # BLD AUTO: 2.76 X10(3) UL (ref 1–4)
LYMPHOCYTES NFR BLD AUTO: 29.3 %
MCH RBC QN AUTO: 22.8 PG (ref 26–34)
MCHC RBC AUTO-ENTMCNC: 30.7 G/DL (ref 31–37)
MCV RBC AUTO: 74.1 FL (ref 80–100)
MONOCYTES # BLD AUTO: 0.77 X10(3) UL (ref 0.1–1)
MONOCYTES NFR BLD AUTO: 8.2 %
NEUTROPHILS # BLD AUTO: 5.75 X10 (3) UL (ref 1.5–7.7)
NEUTROPHILS # BLD AUTO: 5.75 X10(3) UL (ref 1.5–7.7)
NEUTROPHILS NFR BLD AUTO: 60.9 %
NONHDLC SERPL-MCNC: 124 MG/DL (ref ?–130)
OSMOLALITY SERPL CALC.SUM OF ELEC: 287 MOSM/KG (ref 275–295)
PLATELET # BLD AUTO: 479 10(3)UL (ref 150–450)
POTASSIUM SERPL-SCNC: 3.7 MMOL/L (ref 3.5–5.1)
PROT SERPL-MCNC: 7.6 G/DL (ref 5.7–8.2)
RBC # BLD AUTO: 5.14 X10(6)UL (ref 3.8–5.3)
SODIUM SERPL-SCNC: 139 MMOL/L (ref 136–145)
T PALLIDUM AB SER QL IA: NONREACTIVE
TOTAL IRON BINDING CAPACITY: 391 UG/DL (ref 250–425)
TRANSFERRIN SERPL-MCNC: 328 MG/DL (ref 250–380)
TRIGL SERPL-MCNC: 60 MG/DL (ref 30–149)
TSI SER-ACNC: 2.01 UIU/ML (ref 0.55–4.78)
VLDLC SERPL CALC-MCNC: 10 MG/DL (ref 0–30)
WBC # BLD AUTO: 9.4 X10(3) UL (ref 4–11)

## 2025-07-25 PROCEDURE — 83540 ASSAY OF IRON: CPT

## 2025-07-25 PROCEDURE — 86780 TREPONEMA PALLIDUM: CPT

## 2025-07-25 PROCEDURE — 84466 ASSAY OF TRANSFERRIN: CPT

## 2025-07-25 PROCEDURE — 80061 LIPID PANEL: CPT

## 2025-07-25 PROCEDURE — 87591 N.GONORRHOEAE DNA AMP PROB: CPT

## 2025-07-25 PROCEDURE — 83036 HEMOGLOBIN GLYCOSYLATED A1C: CPT

## 2025-07-25 PROCEDURE — 80053 COMPREHEN METABOLIC PANEL: CPT

## 2025-07-25 PROCEDURE — 84443 ASSAY THYROID STIM HORMONE: CPT

## 2025-07-25 PROCEDURE — 36415 COLL VENOUS BLD VENIPUNCTURE: CPT

## 2025-07-25 PROCEDURE — 87340 HEPATITIS B SURFACE AG IA: CPT

## 2025-07-25 PROCEDURE — 87491 CHLMYD TRACH DNA AMP PROBE: CPT

## 2025-07-25 PROCEDURE — 85025 COMPLETE CBC W/AUTO DIFF WBC: CPT

## 2025-07-25 PROCEDURE — 87389 HIV-1 AG W/HIV-1&-2 AB AG IA: CPT

## 2025-07-28 LAB
C TRACH DNA SPEC QL NAA+PROBE: NEGATIVE
N GONORRHOEA DNA SPEC QL NAA+PROBE: NEGATIVE

## 2025-08-02 DIAGNOSIS — Z30.09 BIRTH CONTROL COUNSELING: ICD-10-CM

## 2025-08-05 RX ORDER — LEVONORGESTREL/ETHIN.ESTRADIOL 0.1-0.02MG
1 TABLET ORAL DAILY
Qty: 84 TABLET | Refills: 0 | Status: SHIPPED | OUTPATIENT
Start: 2025-08-05

## 2025-08-08 ENCOUNTER — OFFICE VISIT (OUTPATIENT)
Dept: FAMILY MEDICINE CLINIC | Facility: CLINIC | Age: 25
End: 2025-08-08

## 2025-08-08 ENCOUNTER — PATIENT MESSAGE (OUTPATIENT)
Dept: INTERNAL MEDICINE CLINIC | Facility: CLINIC | Age: 25
End: 2025-08-08

## 2025-08-08 DIAGNOSIS — Z11.1 SCREENING FOR TUBERCULOSIS: Primary | ICD-10-CM

## 2025-08-08 PROCEDURE — 86580 TB INTRADERMAL TEST: CPT | Performed by: PHYSICIAN ASSISTANT

## 2025-08-10 ENCOUNTER — OFFICE VISIT (OUTPATIENT)
Dept: FAMILY MEDICINE CLINIC | Facility: CLINIC | Age: 25
End: 2025-08-10

## 2025-08-10 ENCOUNTER — PATIENT MESSAGE (OUTPATIENT)
Dept: INTERNAL MEDICINE CLINIC | Facility: CLINIC | Age: 25
End: 2025-08-10

## 2025-08-10 DIAGNOSIS — Z11.1 ENCOUNTER FOR PPD TEST: Primary | ICD-10-CM

## 2025-08-10 LAB — INDURATION (): 0 MM (ref 0–11)

## (undated) DEVICE — T&A: Brand: MEDLINE INDUSTRIES, INC.

## (undated) DEVICE — GAMMEX® PI HYBRID SIZE 7, STERILE POWDER-FREE SURGICAL GLOVE, POLYISOPRENE AND NEOPRENE BLEND: Brand: GAMMEX

## (undated) DEVICE — CONMED ACCESSORY ELECTRODE, NEEDLE ELECTRODE

## (undated) DEVICE — REM POLYHESIVE ADULT PATIENT RETURN ELECTRODE: Brand: VALLEYLAB

## (undated) DEVICE — SUCTION CANISTER, 3000CC,SAFELINER: Brand: DEROYAL

## (undated) DEVICE — SOL  .9 1000ML BTL

## (undated) NOTE — MR AVS SNAPSHOT
[address] [facility] [address] [facility] [address] [id number]  [phone]               Thank you for choosing us for your health care visit with rIma Hays MD.  We are glad to serve you and happy to provide you with information, go to [email or web address] and click on the Sign Up Now link in the [employer] box. Enter your [facility] Activation Code exactly as it appears below along with your Zip Code and Date of Birth to complete the sign-up process.  If you do You don’t need to join a gym. Home exercises work great.  Put more priority on exercise in your life                    Visit [facility] online at  [email or web address]

## (undated) NOTE — LETTER
August 7, 2024      Mariana Yeh  [address] [address]      Dear Mariana:    Your TB test today was negative.    Date given: August 5, 2024  Date read: August 7, 2024        Please call if you have further questions,    [phone]

## (undated) NOTE — LETTER
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  The mo/da/yr for every dose administered is required.  If a specific vaccine is medically contraindicated, a separate written statement must be attached by the health care provider responsible for completing the health examination explaining the medical reason for the contradiction.   VACCINE/DOSE DATE DATE DATE DATE DATE   Diphtheria, Tetanus and Pertussis (DTP or DTap) 12/29/1998 4/30/1999 7/9/1999 10/2/2000 7/11/2003   Tdap 7/16/2013 6/26/2023      Td 10/6/2009       Pediatric DT        Inactivate Polio (IPV) 12/29/1998 4/30/1999 10/2/2000 7/11/2003 10/6/2009   Oral Polio (OPV) 10/6/2009       Haemophilus Influenza Type B (Hib) 12/29/1998 4/30/1999 7/9/1999 10/2/2000    Hepatitis B (HB) 10/25/1998 12/25/1998 12/29/1998 4/30/1999    Varicella (Chickenpox) 10/6/2009 9/28/2010      Combined Measles, Mumps and Rubella (MMR) 10/2/2000 7/11/2003 10/6/2009     Measles (Rubeola)        Rubella (3-day measles)        Mumps        Pneumococcal        Meningococcal Conjugate 9/28/2010 8/12/2016 9/10/2016        RECOMMENDED, BUT NOT REQUIRED  Vaccine/Dose        VACCINE/DOSE DATE DATE DATE   Hepatitis A 10/2/2008     HPV 9/10/2016 11/30/2022 9/18/2023   Influenza      Men B      Covid         Other:  Specify Immunization/Adminstered Dates:   Health care provider (MD, DO, APN, PA , school health professional) verifying above immunization history must sign below.  Signature   ***                                                                                                                                      Title                           Date  8/2/2024   Signature                                                                                                                                              Title                           Date    (If adding dates to the above immunization history section, put your initials by date(s) and sign here.)   ALTERNATIVE PROOF OF IMMUNITY   1.Clinical diagnosis (measles, mumps, hepatits B) is allowed when verified by physician & supported with lab confirmation. Attach copy of lab result.       *MEASLES (Rubeola)  MO/DA/YR        * MUMPS MO/DA/YR       HEPATITIS B   MO/DA/YR        VARICELLA MO/DA/YR           2.  History of varicella (chickenpox) disease is acceptable if verified by health care provider, school health professional, or health official.       Person signing below is verifying  parent/guardian’s description of varicella disease is indicative of past infection and is accepting such hx as documentation of disease.       Date of Disease                                  Signature                                                                         Title                           Date             3.  Lab Evidence of Immunity (check one)    __Measles*       __Mumps *       __Rubella        __Varicella      __Hepatitis B       *Measles diagnosed on/after 7/1/2002 AND mumps diagnosed on/after 7/1/2013 must be confirmed by laboratory evidence   Completion of Alternatives 1 or 3 MUST be accompanied by Labs & Physician Signature:  Physician Statements of Immunity MUST be submitted to [facility] for review.   Certificates of Religion Exemption to Immunizations or Physician Medical Statements of Medical Contraindication are Reviewed and Maintained by the School Authority.           Student's Name  Mariana Yeh Birth Date  10/25/2000  Sex  Female School   Grade  Level/ID#  {Grade:1366}   HEALTH HISTORY          TO BE COMPLETED AND SIGNED BY PARENT/GUARDIAN AND VERIFIED BY HEALTH CARE PROVIDER    ALLERGIES  (Food, drug, insect, other)  Patient has no known allergies. MEDICATION  (List all prescribed or taken on a regular basis.)    Current Outpatient Medications:     Phentermine HCl 30 MG Oral Cap, Take 1 capsule (30 mg total) by mouth every morning., Disp: 30 capsule, Rfl: 1    Levonorgestrel-Ethinyl Estrad (LUTERA) 0.1-20 MG-MCG Oral Tab, Take 1 tablet by mouth daily., Disp: 84 tablet, Rfl: 3    Phentermine HCl 30 MG Oral Cap, Take 1 capsule (30 mg total) by mouth every morning., Disp: 30 capsule, Rfl: 1    ibuprofen 600 MG Oral Tab, Take 1 tablet (600 mg total) by mouth every 6 (six) hours as needed for Pain., Disp: 30 tablet, Rfl: 0    acetaminophen 325 MG Oral Tab, Take 2 tablets (650 mg total) by mouth every 6 (six) hours as needed. (Patient not taking: Reported on 3/18/2024), Disp: 30 tablet, Rfl: 0    Phentermine HCl Does not apply Powder, Take by mouth. (Patient not taking: Reported on 3/18/2024), Disp: , Rfl:     Ferrous Sulfate 325 (65 Fe) MG Oral Tab, Take 1 tablet (325 mg total) by mouth daily with breakfast., Disp: , Rfl:     metRONIDAZOLE 500 MG Oral Tab, Take 1 tablet (500 mg total) by mouth 2 (two) times daily. (Patient not taking: Reported on 3/18/2024), Disp: , Rfl:     fluconazole 150 MG Oral Tab, , Disp: , Rfl:     metRONIDAZOLE 500 MG Oral Tab, Take 1 tablet (500 mg total) by mouth 2 (two) times daily. (Patient not taking: Reported on 3/18/2024), Disp: 14 tablet, Rfl: 0    Phentermine HCl 15 MG Oral Cap, Take 1 capsule (15 mg total) by mouth every morning. (Patient not taking: Reported on 3/18/2024), Disp: 30 capsule, Rfl: 0    ferrous sulfate 325 (65 FE) MG Oral Tab EC, Take 1 tablet (325 mg total) by mouth 2 (two) times daily with meals. (Patient not taking: Reported on 3/18/2024), Disp: 180 tablet, Rfl: 1    docusate sodium 100 MG Oral Cap, Take 1  capsule (100 mg total) by mouth nightly as needed for constipation. (Patient not taking: Reported on 3/18/2024), Disp: 90 capsule, Rfl: 0    Omeprazole Magnesium 20 MG Oral Tab EC, Take 1 tablet (20 mg total) by mouth daily with breakfast. (Patient not taking: Reported on 3/18/2024), Disp: 30 tablet, Rfl: 1   Diagnosis of asthma?  Child wakes during the night coughing   Yes   No    Yes   No    Loss of function of one of paired organs? (eye/ear/kidney/testicle)   Yes   No      Birth Defects?  Developmental delay?   Yes   No    Yes   No  Hospitalizations?  When?  What for?   Yes   No    Blood disorders?  Hemophilia, Sickle Cell, Other?  Explain.   Yes   No  Surgery?  (List all.)  When?  What for?   Yes   No    Diabetes?   Yes   No  Serious injury or illness?   Yes   No    Head Injury/Concussion/Passed out?   Yes   No  TB skin text positive (past/present)?   Yes   No *If yes, refer to local    Seizures?  What are they like?   Yes   No  TB disease (past or present)?   Yes   No *health department   Heart problem/Shortness of breath?   Yes   No  Tobacco use (type, frequency)?   Yes   No    Heart murmur/High blood pressure?   Yes   No  Alcohol/Drug use?   Yes   No    Dizziness or chest pain with exercise?   Yes   No  Fam hx sudden death < age 50 (Cause?)    Yes   No    Eye/Vision problems?  Yes  No   Glasses  Yes   No  Contacts  Yes    No   Last eye exam___  Other concerns? (crossed eye, drooping lids, squinting, difficulty reading) Dental:  ____Braces    ____Bridge    ____Plate    ____Other  Other concerns?     Ear/Hearing problems?   Yes   No  Information may be shared with appropriate personnel for health /educational purposes.   Bone/Joint problem/injury/scoliosis?   Yes   No  Parent/Guardian Signature                                          Date     PHYSICAL EXAMINATION REQUIREMENTS    Entire section below to be completed by MD//APN/PA       PHYSICAL EXAMINATION REQUIREMENTS (head circumference if <2-3 years old):    There were no vitals taken for this visit.    DIABETES SCREENING  BMI>85% age/sex  {YES_NO:585:x:\"No\"} And any two of the following:  Family History {YES_NO:585::\"No\"}    Ethnic Minority  {YES_NO:585::\"No\"}          Signs of Insulin Resistance (hypertension, dyslipidemia, polycystic ovarian syndrome, acanthosis nigricans)    {YES_NO:585::\"No\"}           At Risk  {YES_NO:585::\"No\"}   Lead Risk Questionnaire  Req'd for children 6 months thru 6 yrs enrolled in licensed or public school operated day care, ,  nursery school and/or  (blood test req’d if resides in [facility] or high risk [facility])   Questionnaire Administered:{YES:829::\"Yes\"}   Blood Test Indicated:{NO:830::\"No\"}   Blood Test Date                 Result:                 TB Skin OR Blood Test   Rec.only for children in high-risk groups incl. children immunosuppressed due to HIV infection or other conditions, frequent travel to or born in high prevalence countries or those exposed to adults in high-risk categories.  See CDCguidelines.  http://www.cdc.gov/tb/publications/factsheets/testing/TB_testing.htm.      {TB_SKIN_TEST:1380::\"No Test Needed\"}        Skin Test:     Date Read                  /      /              Result:  {POS_NE::\"      \"}             mm    ______________                         Blood Test:   Date Reported          /      /              Result:  {POS_NE::\"     \"}           Value ______________               LAB TESTS (Recommended) Date Results  Date Results   Hemoglobin or Hematocrit   Sickle Cell  (when indicated)     Urinalysis   Developmental Screening Tool     SYSTEM REVIEW Normal Comments/Follow-up/Needs  Normal Comments/Follow-up/Needs   Skin {YES:829::\"Yes\"}  Endocrine {YES:829::\"Yes\"}    Ears {YES:829::\"Yes\"}                      Screen result: Gastrointestinal {YES:829::\"Yes\"}    Eyes {YES:829::\"Yes\"}     Screen result:   Genito-Urinary {YES:829::\"Yes\"}  LMP   Nose {YES:829::\"Yes\"}  Neurological  {YES:829::\"Yes\"}    Throat {YES:829::\"Yes\"}  Musculoskeletal {YES:829::\"Yes\"}    Mouth/Dental {YES:829::\"Yes\"}  Spinal examination {YES:829::\"Yes\"}    Cardiovascular/HTN {YES:829::\"Yes\"}  Nutritional status {YES:829::\"Yes\"}    Respiratory {YES:829::\"Yes\"}                   Diagnosis of Asthma: {NO:830::\"No\"} Mental Health {YES:829::\"Yes\"}        Currently Prescribed Asthma Medication:            Quick-relief  medication (e.g. Short Acting Beta Antagonist): {NO:830::\"No\"}          Controller medication (e.g. inhaled corticosteroid):   {NO:830::\"No\"} Other   NEEDS/MODIFICATIONS required in the school setting  {NONE:1367::\"None\"} DIETARY Needs/Restrictions     {NONE:1367::\"None\"}   SPECIAL INSTRUCTIONS/DEVICES e.g. safety glasses, glass eye, chest protector for arrhythmia, pacemaker, prosthetic device, dental bridge, false teeth, athleticsupport/cup     {DMG_NONE:1367::\"None\"}   MENTAL HEALTH/OTHER   Is there anything else the school should know about this student?  {NO:830::\"No\"}  If you would like to discuss this student's health with school or school health professional, check title:  __Nurse  __Teacher  __Counselor  __Principal   EMERGENCY ACTION  needed while at school due to child's health condition (e.g., seizures, asthma, insect sting, food, peanut allergy, bleeding problem, diabetes, heart problem)?  {NO:830::\"No\"}  If yes, please describe.     On the basis of the examination on this day, I approve this child's participation in        (If No or Modified, please attach explanation.)  PHYSICAL EDUCATION    {Y/N/MODIFIED:1369::\"Yes\"}      [employer]   {BLANK, Y/N/MODIFIED:1483::\"Yes\"}   Physician/Advanced Practice Nurse/Physician Assistant performing examination  Print Name  AME Webb                                            Signature   ***                                                                                    Date  8/2/2024     Address/Phone  [facility],  [facility], [address] [place] [id number]  [phone]   Rev 11/15                                                                    Printed by the Authority of the [facility]

## (undated) NOTE — LETTER
Date: 12/4/2023    Patient Name: Jihan Olivares          To Whom it may concern: This letter has been written at the patient's request. The above patient was seen at the [facility] for treatment of a medical condition. This patient should be excused from attending work from 12/04/2023 through 12/05/2023. The patient may return to work on 12/06/2023.         Sincerely,          AME Hackett

## (undated) NOTE — Clinical Note
Ilan Randle you so much for the referral! I asked her to do blood work first before starting any medications  Will keep you updated!  Jimena

## (undated) NOTE — MR AVS SNAPSHOT
[address]  [phone]               Thank you for choosing us for your health care visit with Lorena Kidd CNM.   We are glad to serve you and happy to provide you with this sum APPLY 1 PATCH EXTERNALLY TO THE SKIN 1 TIME A WEEK AS DIRECTED                   Results of Recent Testing     URINE PREGNANCY TEST      Component Value Standard Range & Units    Pregnancy Test, Urine negative     Control Line Present with a clear backgrou

## (undated) NOTE — LETTER
Date: 1/7/2025    Patient Name: Mariana Yeh          To Whom it may concern:    The above patient was seen at [facility] for treatment of a medical condition.    This patient should be excused from attending work until fever free for 24 hours with no medication and respiratory symptoms are mostly improved per patient report.    The patient is expected to return to work on or around 1/12/25 with no limitations.        Sincerely,        AME Esposito

## (undated) NOTE — LETTER
No referring provider defined for this encounter. 06/25/20        Patient: Sonia Acosta   YOB: 2000   Date of Visit: 6/25/2020       Dear  Dr. Tolu Schuster MD,      Thank you for referring Sonia Acosta to my practice.   Please

## (undated) NOTE — ED AVS SNAPSHOT
Joo August   MRN: [id number]    Department:  [facility] Emergency Department   Date of Visit:  12/28/2019           Disclosure     Insurance plans vary and the physician(s) referred by the ER may not be covered by your plan.  Please conta CARE PHYSICIAN AT ONCE OR RETURN IMMEDIATELY TO THE EMERGENCY DEPARTMENT. If you have been prescribed any medication(s), please fill your prescription right away and begin taking the medication(s) as directed.   If you believe that any of the medications

## (undated) NOTE — LETTER
:  10/25/2000      To Whom It May Concern: This patient was seen in our office on 10/04/23 . Work status: May return to work full-time with restrictions 10/9/23. No heavy lifting more than 20 lbs . If this office may be of further assistance, please do not hesitate to contact us.       Sincerely,        Gopal Klein MD